# Patient Record
Sex: MALE | Race: WHITE | NOT HISPANIC OR LATINO | URBAN - METROPOLITAN AREA
[De-identification: names, ages, dates, MRNs, and addresses within clinical notes are randomized per-mention and may not be internally consistent; named-entity substitution may affect disease eponyms.]

---

## 2024-04-22 ENCOUNTER — ANESTHESIA (OUTPATIENT)
Dept: CARDIOLOGY | Facility: HOSPITAL | Age: 69
End: 2024-04-22
Payer: MEDICARE

## 2024-04-22 ENCOUNTER — ANESTHESIA EVENT (OUTPATIENT)
Dept: CARDIOLOGY | Facility: HOSPITAL | Age: 69
End: 2024-04-22
Payer: MEDICARE

## 2024-04-22 ENCOUNTER — HOSPITAL ENCOUNTER (OUTPATIENT)
Facility: HOSPITAL | Age: 69
Discharge: HOME | End: 2024-04-23
Attending: EMERGENCY MEDICINE | Admitting: STUDENT IN AN ORGANIZED HEALTH CARE EDUCATION/TRAINING PROGRAM
Payer: MEDICARE

## 2024-04-22 DIAGNOSIS — I48.92 ATRIAL FLUTTER WITH RAPID VENTRICULAR RESPONSE: Primary | ICD-10-CM

## 2024-04-22 DIAGNOSIS — R07.9 CHEST PAIN: ICD-10-CM

## 2024-04-22 DIAGNOSIS — R06.02 SHORTNESS OF BREATH: ICD-10-CM

## 2024-04-22 PROBLEM — E83.51 HYPOCALCEMIA: Status: ACTIVE | Noted: 2024-04-22

## 2024-04-22 PROBLEM — E11.9 TYPE 2 DIABETES MELLITUS WITHOUT COMPLICATION, WITHOUT LONG-TERM CURRENT USE OF INSULIN: Status: ACTIVE | Noted: 2024-04-22

## 2024-04-22 PROBLEM — E83.42 HYPOMAGNESEMIA: Status: ACTIVE | Noted: 2024-04-22

## 2024-04-22 PROBLEM — I10 PRIMARY HYPERTENSION: Status: ACTIVE | Noted: 2024-04-22

## 2024-04-22 PROBLEM — I50.9 ACUTE CONGESTIVE HEART FAILURE: Status: ACTIVE | Noted: 2024-04-22

## 2024-04-22 LAB
ALBUMIN SERPL BCP-MCNC: 1.2 G/DL (ref 3.5–5.2)
ALBUMIN SERPL BCP-MCNC: 2.8 G/DL (ref 3.5–5.2)
ALBUMIN SERPL BCP-MCNC: 3.8 G/DL (ref 3.5–5.2)
ALP SERPL-CCNC: 23 U/L (ref 55–135)
ALP SERPL-CCNC: 54 U/L (ref 55–135)
ALP SERPL-CCNC: 73 U/L (ref 55–135)
ALT SERPL W/O P-5'-P-CCNC: 14 U/L (ref 10–44)
ALT SERPL W/O P-5'-P-CCNC: 19 U/L (ref 10–44)
ALT SERPL W/O P-5'-P-CCNC: <5 U/L (ref 10–44)
ANION GAP SERPL CALC-SCNC: 11 MMOL/L (ref 8–16)
ANION GAP SERPL CALC-SCNC: 14 MMOL/L (ref 8–16)
ANION GAP SERPL CALC-SCNC: 5 MMOL/L (ref 8–16)
AORTIC ROOT ANNULUS: 3.53 CM
AORTIC VALVE CUSP SEPERATION: 1.81 CM
ASCENDING AORTA: 3.31 CM
AST SERPL-CCNC: 14 U/L (ref 10–40)
AST SERPL-CCNC: 19 U/L (ref 10–40)
AST SERPL-CCNC: 5 U/L (ref 10–40)
AV INDEX (PROSTH): 0.77
AV MEAN GRADIENT: 2 MMHG
AV PEAK GRADIENT: 3 MMHG
AV VALVE AREA BY VELOCITY RATIO: 4.25 CM²
AV VALVE AREA: 3.54 CM²
AV VELOCITY RATIO: 0.92
BASOPHILS # BLD AUTO: 0.03 K/UL (ref 0–0.2)
BASOPHILS NFR BLD: 0.5 % (ref 0–1.9)
BILIRUB SERPL-MCNC: 0.8 MG/DL (ref 0.1–1)
BILIRUB SERPL-MCNC: 1.7 MG/DL (ref 0.1–1)
BILIRUB SERPL-MCNC: 2.4 MG/DL (ref 0.1–1)
BNP SERPL-MCNC: 110 PG/ML (ref 0–99)
BSA FOR ECHO PROCEDURE: 2.2 M2
BSA FOR ECHO PROCEDURE: 2.2 M2
BUN SERPL-MCNC: 14 MG/DL (ref 8–23)
BUN SERPL-MCNC: 14 MG/DL (ref 8–23)
BUN SERPL-MCNC: 8 MG/DL (ref 8–23)
CALCIUM SERPL-MCNC: 10.1 MG/DL (ref 8.7–10.5)
CALCIUM SERPL-MCNC: 6.6 MG/DL (ref 8.7–10.5)
CALCIUM SERPL-MCNC: 7.5 MG/DL (ref 8.7–10.5)
CHLORIDE SERPL-SCNC: 102 MMOL/L (ref 95–110)
CHLORIDE SERPL-SCNC: 107 MMOL/L (ref 95–110)
CHLORIDE SERPL-SCNC: 112 MMOL/L (ref 95–110)
CO2 SERPL-SCNC: 12 MMOL/L (ref 23–29)
CO2 SERPL-SCNC: 22 MMOL/L (ref 23–29)
CO2 SERPL-SCNC: 27 MMOL/L (ref 23–29)
CREAT SERPL-MCNC: 0.3 MG/DL (ref 0.5–1.4)
CREAT SERPL-MCNC: 0.7 MG/DL (ref 0.5–1.4)
CREAT SERPL-MCNC: 1 MG/DL (ref 0.5–1.4)
CV ECHO LV RWT: 0.6 CM
DIFFERENTIAL METHOD BLD: NORMAL
DOP CALC AO PEAK VEL: 0.93 M/S
DOP CALC AO VTI: 17.9 CM
DOP CALC LVOT AREA: 4.6 CM2
DOP CALC LVOT DIAMETER: 2.42 CM
DOP CALC LVOT PEAK VEL: 0.86 M/S
DOP CALC LVOT STROKE VOLUME: 63.44 CM3
DOP CALCLVOT PEAK VEL VTI: 13.8 CM
ECHO LV POSTERIOR WALL: 1.44 CM (ref 0.6–1.1)
EOSINOPHIL # BLD AUTO: 0.1 K/UL (ref 0–0.5)
EOSINOPHIL NFR BLD: 1.3 % (ref 0–8)
ERYTHROCYTE [DISTWIDTH] IN BLOOD BY AUTOMATED COUNT: 13.1 % (ref 11.5–14.5)
EST. GFR  (NO RACE VARIABLE): >60 ML/MIN/1.73 M^2
FRACTIONAL SHORTENING: 11 % (ref 28–44)
GLUCOSE SERPL-MCNC: 127 MG/DL (ref 70–110)
GLUCOSE SERPL-MCNC: 222 MG/DL (ref 70–110)
GLUCOSE SERPL-MCNC: 278 MG/DL (ref 70–110)
HCT VFR BLD AUTO: 44.7 % (ref 40–54)
HGB BLD-MCNC: 15 G/DL (ref 14–18)
IMM GRANULOCYTES # BLD AUTO: 0.02 K/UL (ref 0–0.04)
IMM GRANULOCYTES NFR BLD AUTO: 0.3 % (ref 0–0.5)
INTERVENTRICULAR SEPTUM: 1.33 CM (ref 0.6–1.1)
IVC DIAMETER: 2.8 CM
IVRT: 112.27 MSEC
LA MAJOR: 6.14 CM
LA MINOR: 5.48 CM
LA WIDTH: 4.5 CM
LEFT ATRIUM SIZE: 4.46 CM
LEFT ATRIUM VOLUME INDEX: 45.3 ML/M2
LEFT ATRIUM VOLUME: 98.8 CM3
LEFT INTERNAL DIMENSION IN SYSTOLE: 4.3 CM (ref 2.1–4)
LEFT VENTRICLE DIASTOLIC VOLUME INDEX: 50.01 ML/M2
LEFT VENTRICLE DIASTOLIC VOLUME: 109.03 ML
LEFT VENTRICLE MASS INDEX: 125 G/M2
LEFT VENTRICLE SYSTOLIC VOLUME INDEX: 38.1 ML/M2
LEFT VENTRICLE SYSTOLIC VOLUME: 82.95 ML
LEFT VENTRICULAR INTERNAL DIMENSION IN DIASTOLE: 4.83 CM (ref 3.5–6)
LEFT VENTRICULAR MASS: 272.08 G
LVOT MG: 1.68 MMHG
LVOT MV: 0.62 CM/S
LYMPHOCYTES # BLD AUTO: 1.8 K/UL (ref 1–4.8)
LYMPHOCYTES NFR BLD: 28.5 % (ref 18–48)
MAGNESIUM SERPL-MCNC: 1.3 MG/DL (ref 1.6–2.6)
MAGNESIUM SERPL-MCNC: 2.1 MG/DL (ref 1.6–2.6)
MAGNESIUM SERPL-MCNC: <0.7 MG/DL (ref 1.6–2.6)
MCH RBC QN AUTO: 29.1 PG (ref 27–31)
MCHC RBC AUTO-ENTMCNC: 33.6 G/DL (ref 32–36)
MCV RBC AUTO: 87 FL (ref 82–98)
MONOCYTES # BLD AUTO: 0.4 K/UL (ref 0.3–1)
MONOCYTES NFR BLD: 6.8 % (ref 4–15)
NEUTROPHILS # BLD AUTO: 3.9 K/UL (ref 1.8–7.7)
NEUTROPHILS NFR BLD: 62.6 % (ref 38–73)
NRBC BLD-RTO: 0 /100 WBC
OHS CV RV/LV RATIO: 0.94 CM
PISA TR MAX VEL: 3.22 M/S
PLATELET # BLD AUTO: 178 K/UL (ref 150–450)
PMV BLD AUTO: 12 FL (ref 9.2–12.9)
POCT GLUCOSE: 246 MG/DL (ref 70–110)
POTASSIUM SERPL-SCNC: 3.3 MMOL/L (ref 3.5–5.1)
POTASSIUM SERPL-SCNC: 4 MMOL/L (ref 3.5–5.1)
POTASSIUM SERPL-SCNC: 4.2 MMOL/L (ref 3.5–5.1)
PROT SERPL-MCNC: 2.2 G/DL (ref 6–8.4)
PROT SERPL-MCNC: 5.1 G/DL (ref 6–8.4)
PROT SERPL-MCNC: 7.1 G/DL (ref 6–8.4)
PULM VEIN S/D RATIO: 0.72
PV PEAK D VEL: 0.36 M/S
PV PEAK GRADIENT: 3 MMHG
PV PEAK S VEL: 0.26 M/S
PV PEAK VELOCITY: 0.86 M/S
RA MAJOR: 6.06 CM
RA PRESSURE ESTIMATED: 15 MMHG
RA WIDTH: 4.8 CM
RBC # BLD AUTO: 5.16 M/UL (ref 4.6–6.2)
RIGHT VENTRICULAR END-DIASTOLIC DIMENSION: 4.54 CM
RV TB RVSP: 18 MMHG
RV TISSUE DOPPLER FREE WALL SYSTOLIC VELOCITY 1 (APICAL 4 CHAMBER VIEW): 10.67 CM/S
SINUS: 3.6 CM
SINUS: 3.67 CM
SODIUM SERPL-SCNC: 133 MMOL/L (ref 136–145)
SODIUM SERPL-SCNC: 139 MMOL/L (ref 136–145)
SODIUM SERPL-SCNC: 140 MMOL/L (ref 136–145)
STJ: 3.26 CM
TR MAX PG: 41 MMHG
TRICUSPID ANNULAR PLANE SYSTOLIC EXCURSION: 1.77 CM
TROPONIN I SERPL DL<=0.01 NG/ML-MCNC: 0.04 NG/ML (ref 0–0.03)
TSH SERPL DL<=0.005 MIU/L-ACNC: 1.66 UIU/ML (ref 0.4–4)
TV REST PULMONARY ARTERY PRESSURE: 56 MMHG
WBC # BLD AUTO: 6.22 K/UL (ref 3.9–12.7)
Z-SCORE OF LEFT VENTRICULAR DIMENSION IN END DIASTOLE: -4.11
Z-SCORE OF LEFT VENTRICULAR DIMENSION IN END SYSTOLE: -0.24

## 2024-04-22 PROCEDURE — 63600175 PHARM REV CODE 636 W HCPCS: Performed by: EMERGENCY MEDICINE

## 2024-04-22 PROCEDURE — 85025 COMPLETE CBC W/AUTO DIFF WBC: CPT | Performed by: EMERGENCY MEDICINE

## 2024-04-22 PROCEDURE — 25000003 PHARM REV CODE 250: Performed by: STUDENT IN AN ORGANIZED HEALTH CARE EDUCATION/TRAINING PROGRAM

## 2024-04-22 PROCEDURE — 37000009 HC ANESTHESIA EA ADD 15 MINS: Performed by: INTERNAL MEDICINE

## 2024-04-22 PROCEDURE — 96375 TX/PRO/DX INJ NEW DRUG ADDON: CPT

## 2024-04-22 PROCEDURE — 25000242 PHARM REV CODE 250 ALT 637 W/ HCPCS: Performed by: INTERNAL MEDICINE

## 2024-04-22 PROCEDURE — D9220A PRA ANESTHESIA: Mod: CRNA,,, | Performed by: NURSE ANESTHETIST, CERTIFIED REGISTERED

## 2024-04-22 PROCEDURE — 63600175 PHARM REV CODE 636 W HCPCS: Performed by: NURSE ANESTHETIST, CERTIFIED REGISTERED

## 2024-04-22 PROCEDURE — 93010 ELECTROCARDIOGRAM REPORT: CPT | Mod: XU,,, | Performed by: INTERNAL MEDICINE

## 2024-04-22 PROCEDURE — 82330 ASSAY OF CALCIUM: CPT | Performed by: STUDENT IN AN ORGANIZED HEALTH CARE EDUCATION/TRAINING PROGRAM

## 2024-04-22 PROCEDURE — 84443 ASSAY THYROID STIM HORMONE: CPT | Performed by: EMERGENCY MEDICINE

## 2024-04-22 PROCEDURE — 80053 COMPREHEN METABOLIC PANEL: CPT | Performed by: EMERGENCY MEDICINE

## 2024-04-22 PROCEDURE — 25000003 PHARM REV CODE 250: Performed by: INTERNAL MEDICINE

## 2024-04-22 PROCEDURE — 27000221 HC OXYGEN, UP TO 24 HOURS

## 2024-04-22 PROCEDURE — 63600175 PHARM REV CODE 636 W HCPCS: Performed by: INTERNAL MEDICINE

## 2024-04-22 PROCEDURE — 25000003 PHARM REV CODE 250: Performed by: EMERGENCY MEDICINE

## 2024-04-22 PROCEDURE — G0378 HOSPITAL OBSERVATION PER HR: HCPCS

## 2024-04-22 PROCEDURE — 99285 EMERGENCY DEPT VISIT HI MDM: CPT | Mod: 25

## 2024-04-22 PROCEDURE — 99291 CRITICAL CARE FIRST HOUR: CPT | Mod: 25,,, | Performed by: INTERNAL MEDICINE

## 2024-04-22 PROCEDURE — 83735 ASSAY OF MAGNESIUM: CPT | Mod: 91 | Performed by: STUDENT IN AN ORGANIZED HEALTH CARE EDUCATION/TRAINING PROGRAM

## 2024-04-22 PROCEDURE — 83880 ASSAY OF NATRIURETIC PEPTIDE: CPT | Performed by: EMERGENCY MEDICINE

## 2024-04-22 PROCEDURE — 37000008 HC ANESTHESIA 1ST 15 MINUTES: Performed by: INTERNAL MEDICINE

## 2024-04-22 PROCEDURE — 83036 HEMOGLOBIN GLYCOSYLATED A1C: CPT | Performed by: STUDENT IN AN ORGANIZED HEALTH CARE EDUCATION/TRAINING PROGRAM

## 2024-04-22 PROCEDURE — 93005 ELECTROCARDIOGRAM TRACING: CPT

## 2024-04-22 PROCEDURE — 96372 THER/PROPH/DIAG INJ SC/IM: CPT | Performed by: INTERNAL MEDICINE

## 2024-04-22 PROCEDURE — 25000003 PHARM REV CODE 250: Performed by: NURSE ANESTHETIST, CERTIFIED REGISTERED

## 2024-04-22 PROCEDURE — 96361 HYDRATE IV INFUSION ADD-ON: CPT

## 2024-04-22 PROCEDURE — 63600175 PHARM REV CODE 636 W HCPCS: Performed by: STUDENT IN AN ORGANIZED HEALTH CARE EDUCATION/TRAINING PROGRAM

## 2024-04-22 PROCEDURE — 94761 N-INVAS EAR/PLS OXIMETRY MLT: CPT

## 2024-04-22 PROCEDURE — 96360 HYDRATION IV INFUSION INIT: CPT | Mod: 59

## 2024-04-22 PROCEDURE — 80053 COMPREHEN METABOLIC PANEL: CPT | Mod: 91 | Performed by: STUDENT IN AN ORGANIZED HEALTH CARE EDUCATION/TRAINING PROGRAM

## 2024-04-22 PROCEDURE — 93010 ELECTROCARDIOGRAM REPORT: CPT | Mod: 76,ICN,, | Performed by: INTERNAL MEDICINE

## 2024-04-22 PROCEDURE — D9220A PRA ANESTHESIA: Mod: ANES,,, | Performed by: ANESTHESIOLOGY

## 2024-04-22 PROCEDURE — 96374 THER/PROPH/DIAG INJ IV PUSH: CPT

## 2024-04-22 PROCEDURE — 36415 COLL VENOUS BLD VENIPUNCTURE: CPT | Performed by: STUDENT IN AN ORGANIZED HEALTH CARE EDUCATION/TRAINING PROGRAM

## 2024-04-22 PROCEDURE — 25000003 PHARM REV CODE 250: Mod: JZ,JG | Performed by: EMERGENCY MEDICINE

## 2024-04-22 PROCEDURE — 83735 ASSAY OF MAGNESIUM: CPT | Mod: 91 | Performed by: EMERGENCY MEDICINE

## 2024-04-22 PROCEDURE — 84484 ASSAY OF TROPONIN QUANT: CPT | Performed by: EMERGENCY MEDICINE

## 2024-04-22 RX ORDER — SODIUM CHLORIDE 0.9 % (FLUSH) 0.9 %
10 SYRINGE (ML) INJECTION EVERY 12 HOURS PRN
Status: DISCONTINUED | OUTPATIENT
Start: 2024-04-22 | End: 2024-04-23 | Stop reason: HOSPADM

## 2024-04-22 RX ORDER — LIDOCAINE HYDROCHLORIDE 20 MG/ML
INJECTION INTRAVENOUS
Status: DISCONTINUED | OUTPATIENT
Start: 2024-04-22 | End: 2024-04-22

## 2024-04-22 RX ORDER — ENOXAPARIN SODIUM 100 MG/ML
1 INJECTION SUBCUTANEOUS
Status: COMPLETED | OUTPATIENT
Start: 2024-04-22 | End: 2024-04-22

## 2024-04-22 RX ORDER — IBUPROFEN 200 MG
16 TABLET ORAL
Status: DISCONTINUED | OUTPATIENT
Start: 2024-04-22 | End: 2024-04-23 | Stop reason: HOSPADM

## 2024-04-22 RX ORDER — CALCIUM GLUCONATE 20 MG/ML
1 INJECTION, SOLUTION INTRAVENOUS
Status: COMPLETED | OUTPATIENT
Start: 2024-04-22 | End: 2024-04-22

## 2024-04-22 RX ORDER — SODIUM CHLORIDE 0.9 % (FLUSH) 0.9 %
10 SYRINGE (ML) INJECTION
Status: DISCONTINUED | OUTPATIENT
Start: 2024-04-22 | End: 2024-04-23 | Stop reason: HOSPADM

## 2024-04-22 RX ORDER — LIDOCAINE HYDROCHLORIDE 40 MG/ML
SOLUTION TOPICAL
Status: DISCONTINUED | OUTPATIENT
Start: 2024-04-22 | End: 2024-04-22

## 2024-04-22 RX ORDER — MAGNESIUM SULFATE 1 G/100ML
1 INJECTION INTRAVENOUS
Status: COMPLETED | OUTPATIENT
Start: 2024-04-22 | End: 2024-04-22

## 2024-04-22 RX ORDER — SPIRONOLACTONE 25 MG/1
25 TABLET ORAL DAILY
Status: DISCONTINUED | OUTPATIENT
Start: 2024-04-22 | End: 2024-04-23 | Stop reason: HOSPADM

## 2024-04-22 RX ORDER — ETOMIDATE 2 MG/ML
INJECTION INTRAVENOUS
Status: DISCONTINUED | OUTPATIENT
Start: 2024-04-22 | End: 2024-04-22

## 2024-04-22 RX ORDER — INSULIN ASPART 100 [IU]/ML
0-5 INJECTION, SOLUTION INTRAVENOUS; SUBCUTANEOUS
Status: DISCONTINUED | OUTPATIENT
Start: 2024-04-22 | End: 2024-04-23 | Stop reason: HOSPADM

## 2024-04-22 RX ORDER — DILTIAZEM HYDROCHLORIDE 5 MG/ML
15 INJECTION INTRAVENOUS
Status: COMPLETED | OUTPATIENT
Start: 2024-04-22 | End: 2024-04-22

## 2024-04-22 RX ORDER — ONDANSETRON HYDROCHLORIDE 2 MG/ML
INJECTION, SOLUTION INTRAVENOUS
Status: DISCONTINUED | OUTPATIENT
Start: 2024-04-22 | End: 2024-04-22

## 2024-04-22 RX ORDER — CARVEDILOL 3.12 MG/1
3.12 TABLET ORAL 2 TIMES DAILY
Status: DISCONTINUED | OUTPATIENT
Start: 2024-04-22 | End: 2024-04-23

## 2024-04-22 RX ORDER — NALOXONE HCL 0.4 MG/ML
0.02 VIAL (ML) INJECTION
Status: DISCONTINUED | OUTPATIENT
Start: 2024-04-22 | End: 2024-04-23 | Stop reason: HOSPADM

## 2024-04-22 RX ORDER — CALCIUM GLUCONATE 20 MG/ML
1 INJECTION, SOLUTION INTRAVENOUS ONCE
Qty: 50 ML | Refills: 0 | Status: COMPLETED | OUTPATIENT
Start: 2024-04-22 | End: 2024-04-22

## 2024-04-22 RX ORDER — MAGNESIUM SULFATE 1 G/100ML
1 INJECTION INTRAVENOUS ONCE
Status: COMPLETED | OUTPATIENT
Start: 2024-04-22 | End: 2024-04-22

## 2024-04-22 RX ORDER — GLUCAGON 1 MG
1 KIT INJECTION
Status: DISCONTINUED | OUTPATIENT
Start: 2024-04-22 | End: 2024-04-23 | Stop reason: HOSPADM

## 2024-04-22 RX ORDER — IBUPROFEN 200 MG
24 TABLET ORAL
Status: DISCONTINUED | OUTPATIENT
Start: 2024-04-22 | End: 2024-04-23 | Stop reason: HOSPADM

## 2024-04-22 RX ORDER — FUROSEMIDE 20 MG/1
20 TABLET ORAL 2 TIMES DAILY
Status: DISCONTINUED | OUTPATIENT
Start: 2024-04-22 | End: 2024-04-23 | Stop reason: HOSPADM

## 2024-04-22 RX ADMIN — DILTIAZEM HYDROCHLORIDE 15 MG: 5 INJECTION INTRAVENOUS at 11:04

## 2024-04-22 RX ADMIN — SPIRONOLACTONE 25 MG: 25 TABLET ORAL at 03:04

## 2024-04-22 RX ADMIN — POTASSIUM BICARBONATE 50 MEQ: 977.5 TABLET, EFFERVESCENT ORAL at 03:04

## 2024-04-22 RX ADMIN — FUROSEMIDE 20 MG: 20 TABLET ORAL at 05:04

## 2024-04-22 RX ADMIN — MAGNESIUM SULFATE 1 G: 1 INJECTION INTRAVENOUS at 02:04

## 2024-04-22 RX ADMIN — SACUBITRIL AND VALSARTAN 1 TABLET: 24; 26 TABLET, FILM COATED ORAL at 09:04

## 2024-04-22 RX ADMIN — ENOXAPARIN SODIUM 100 MG: 100 INJECTION SUBCUTANEOUS at 12:04

## 2024-04-22 RX ADMIN — RIVAROXABAN 20 MG: 20 TABLET, FILM COATED ORAL at 05:04

## 2024-04-22 RX ADMIN — CARVEDILOL 3.12 MG: 3.12 TABLET, FILM COATED ORAL at 09:04

## 2024-04-22 RX ADMIN — SODIUM CHLORIDE, SODIUM LACTATE, POTASSIUM CHLORIDE, AND CALCIUM CHLORIDE: .6; .31; .03; .02 INJECTION, SOLUTION INTRAVENOUS at 01:04

## 2024-04-22 RX ADMIN — ETOMIDATE 2 MG: 2 INJECTION, SOLUTION INTRAVENOUS at 01:04

## 2024-04-22 RX ADMIN — CALCIUM GLUCONATE 1 G: 20 INJECTION, SOLUTION INTRAVENOUS at 05:04

## 2024-04-22 RX ADMIN — ETOMIDATE 7 MG: 2 INJECTION, SOLUTION INTRAVENOUS at 01:04

## 2024-04-22 RX ADMIN — LIDOCAINE HYDROCHLORIDE 4 ML: 40 SOLUTION TOPICAL at 01:04

## 2024-04-22 RX ADMIN — LIDOCAINE HYDROCHLORIDE 40 MG: 20 INJECTION, SOLUTION INTRAVENOUS at 01:04

## 2024-04-22 RX ADMIN — EMPAGLIFLOZIN 10 MG: 10 TABLET, FILM COATED ORAL at 03:04

## 2024-04-22 RX ADMIN — CALCIUM GLUCONATE 1 G: 20 INJECTION, SOLUTION INTRAVENOUS at 02:04

## 2024-04-22 RX ADMIN — MAGNESIUM SULFATE 1 G: 1 INJECTION INTRAVENOUS at 03:04

## 2024-04-22 RX ADMIN — SODIUM CHLORIDE, POTASSIUM CHLORIDE, SODIUM LACTATE AND CALCIUM CHLORIDE 1000 ML: 600; 310; 30; 20 INJECTION, SOLUTION INTRAVENOUS at 11:04

## 2024-04-22 RX ADMIN — ONDANSETRON 4 MG: 2 INJECTION, SOLUTION INTRAMUSCULAR; INTRAVENOUS at 01:04

## 2024-04-22 NOTE — LETTER
April 23, 2024         Humberto GREEN  [facility]  [place] [address]  Phone: [phone]  Fax: [phone]       Patient: Varghese Amaya   YOB: 1955  Date of Visit: 4/22/2024    To Whom It May Concern:    Jorge Amaya  was at [facility] on 4/22/2024-4/23/2024. The patient may return to work/school on 4/25/2024 with no restrictions. If you have any questions or concerns, or if I can be of further assistance, please do not hesitate to contact me.    Sincerely,    ILYA Polk RN

## 2024-04-22 NOTE — CONSULTS
[facility] Emergency Dept  Cardiology  Consult Note    Patient Name: Varghese Amaya  MRN: [id number]  Admission Date: 4/22/2024  Hospital Length of Stay: 0 days  Code Status: No Order   Attending Provider: Ramon Garibay MD   Consulting Provider: Pradip Eng MD  Primary Care Physician: No primary care provider on file.  Principal Problem:Atrial flutter with rapid ventricular response    Patient information was obtained from patient and ER records.     Inpatient consult to Cardiology  Consult performed by: Pradip Eng MD  Consult ordered by: Ramon Garibay MD  Reason for consult: AFL/RVR        Subjective:     Chief Complaint:  SOB     HPI:   69 y.o male presents to the ED via EMS transportation c/o intermittent shortness of breath for the last 1.5 weeks. Pt reports that he used his wife's mobile afib monitor and was found to be in atrial fibrillation prompting him to visit [facility] ED. He received 5 mg of Cardizem for treatment and was sent to this ED for further evaluation. Per EMS, pt had an oxygen saturation of 92% on room air. No use of blood thinners. No recent illnesses. He denies chest pain, leg swelling, headache or urine issues. No other associated symptoms.     Cardiology consulted for AF/RVR.    The patient is a very pleasant 69-year-old man who lives in [facility] but works locally.  He presents to the emergency room with progressive shortness of breath and was found to be in atrial flutter with RVR.  Heart rate was controlled with diltiazem.  He is intermittent RVR now.  He denies any overt ongoing shortness of breath.  He has had no angina.  He denies any syncope or prior stroke/TIA.  He does have a history of hypertension and diabetes, giving him a CHADS-VASc score of 3.  Pros and cons of PETTY guided cardioversion were discussed with the patient and he agrees to proceed.  Permit was signed.    No past medical history on file.    No past surgical history on  file.    Review of patient's allergies indicates:  No Known Allergies    No current outpatient medications      Current Facility-Administered Medications   Medication Dose Route Frequency Provider Last Rate Last Admin    enoxaparin injection 100 mg  1 mg/kg Subcutaneous Q12H Pradip Eng MD        lactated ringers bolus 1,000 mL  1,000 mL Intravenous ED 1 Time Ramon Garibay  mL/hr at 04/22/24 1100 1,000 mL at 04/22/24 1100    rivaroxaban tablet 20 mg  20 mg Oral Daily with dinner Pradip Eng MD         No current outpatient medications on file.     Family History    None       Tobacco Use    Smoking status: Not on file    Smokeless tobacco: Not on file   Substance and Sexual Activity    Alcohol use: Not on file    Drug use: Not on file    Sexual activity: Not on file     Review of Systems   Constitutional: Negative for chills, diaphoresis, fever and malaise/fatigue.   HENT:  Negative for nosebleeds.    Eyes:  Negative for blurred vision and double vision.   Cardiovascular:  Positive for dyspnea on exertion. Negative for chest pain, claudication, cyanosis, leg swelling, orthopnea, palpitations, paroxysmal nocturnal dyspnea and syncope.   Respiratory:  Positive for shortness of breath. Negative for cough and wheezing.    Skin:  Negative for dry skin and poor wound healing.   Musculoskeletal:  Negative for back pain, joint swelling and myalgias.   Gastrointestinal:  Negative for abdominal pain, nausea and vomiting.   Genitourinary:  Negative for hematuria.   Neurological:  Negative for dizziness, headaches, numbness, seizures and weakness.   Psychiatric/Behavioral:  Negative for altered mental status and depression.      Objective:     Vital Signs (Most Recent):  Temp: 98.1 °F (36.7 °C) (04/22/24 1049)  Pulse: 89 (04/22/24 1139)  Resp: 17 (04/22/24 1132)  BP: 118/84 (04/22/24 1132)  SpO2: 96 % (04/22/24 1139) Vital Signs (24h Range):  Temp:  [98.1 °F (36.7 °C)] 98.1 °F (36.7 °C)  Pulse:   [] 89  Resp:  [17-20] 17  SpO2:  [96 %-97 %] 96 %  BP: (118-134)/(76-90) 118/84     Weight: 95.3 kg (210 lb)  Body mass index is 28.48 kg/m².    SpO2: 96 %       No intake or output data in the 24 hours ending 04/22/24 1144    Lines/Drains/Airways       Peripheral Intravenous Line  Duration                  Peripheral IV - Single Lumen 04/22/24 1055 20 G Posterior;Right Hand <1 day         Peripheral IV - Single Lumen 04/22/24 20 G Right Antecubital <1 day                     Physical Exam  Constitutional:       General: He is not in acute distress.     Appearance: He is well-developed. He is not ill-appearing, toxic-appearing or diaphoretic.   HENT:      Head: Normocephalic and atraumatic.   Eyes:      General: No scleral icterus.     Extraocular Movements: Extraocular movements intact.      Conjunctiva/sclera: Conjunctivae normal.      Pupils: Pupils are equal, round, and reactive to light.   Neck:      Thyroid: No thyromegaly.      Vascular: No JVD.      Trachea: No tracheal deviation.   Cardiovascular:      Rate and Rhythm: Normal rate. Rhythm irregularly irregular.      Heart sounds: S1 normal and S2 normal. Heart sounds are distant. No murmur heard.     No friction rub. No gallop.   Pulmonary:      Effort: Pulmonary effort is normal. No respiratory distress.      Breath sounds: Normal breath sounds. No stridor. No wheezing, rhonchi or rales.   Chest:      Chest wall: No tenderness.   Abdominal:      General: There is no distension.      Palpations: Abdomen is soft.   Musculoskeletal:         General: No swelling or tenderness. Normal range of motion.      Cervical back: Normal range of motion and neck supple. No rigidity.      Right lower leg: Edema present.      Left lower leg: Edema present.   Skin:     General: Skin is warm and dry.      Coloration: Skin is not jaundiced.   Neurological:      General: No focal deficit present.      Mental Status: He is alert and oriented to person, place, and time.     "  Cranial Nerves: No cranial nerve deficit.   Psychiatric:         Mood and Affect: Mood normal.         Behavior: Behavior normal.          Current Medications:  Current Facility-Administered Medications   Medication Dose Route Frequency    enoxaparin  1 mg/kg Subcutaneous Q12H    lactated ringers  1,000 mL Intravenous ED 1 Time    rivaroxaban  20 mg Oral Daily with dinner     Current Facility-Administered Medications   Medication Dose Route Frequency Last Rate Last Admin         Laboratory (all labs reviewed):  CBC:  Recent Labs   Lab 04/22/24  1100   WBC 6.22   Hemoglobin 15.0   Hematocrit 44.7   Platelets 178       CHEMISTRIES:        Invalid input(s): "ESTGFRNONAFRICA"    CARDIAC BIOMARKERS:        COAGS:        LIPIDS/LFTS:        BNP:        TSH:        Free T4:        Diagnostic Results:  ECG (personally reviewed and interpreted tracing(s)):  4/22/24 1052     Chest X-Ray (personally reviewed and interpreted image(s)): 4/22/24 CHF    Echo: ordered    Assessment and Plan:     * Atrial flutter with rapid ventricular response  The patient presents with several weeks of progressive shortness of breath and found to be in atrial flutter with RVR.    CHADS-VASc 3.    Heart rate reasonably controlled with intravenous diltiazem.    We will plan PETTY guided cardioversion today.    Enoxaparin 1 milligram/kilogram x1 dose, followed by Xarelto 20 mg q.h.s..      Risks, benefits and alternatives of the PETTY/Cardioversion procedure were discussed with the patient and his wife in attendance including throat irritation, aspiration, anesthetic complications, esophageal irritation/perforation, skin irritation, arrhythmia, etc.  Patient understands and agrees to proceed.  Permits signed.       Acute congestive heart failure  Appears to be overloaded on examination with leg swelling and cephalization on chest x-ray.    Laboratories pending.    Consider IV Lasix.    LV function to be evaluated with PETTY today.    Primary " hypertension  Cont med rx    Type 2 diabetes mellitus without complication, without long-term current use of insulin  Mgmt per IM        VTE Risk Mitigation (From admission, onward)           Ordered     rivaroxaban tablet 20 mg  With dinner         04/22/24 1143     enoxaparin injection 100 mg  Every 12 hours (non-standard times)         04/22/24 1143                  Pt seen in trauma bay, case d/w Dr. Garibay.  Intermittent RVR noted, pt tolerating well.  Critical care time 35min.    Thank you for your consult. I will follow-up with patient. Please contact us if you have any additional questions.    Pradip Eng MD  Cardiology   [facility] Dept

## 2024-04-22 NOTE — H&P
[facility] Medicine  History & Physical    Patient Name: Varghese Amaya  MRN: [id number]  Patient Class: OP- Observation  Admission Date: 4/22/2024  Attending Physician: Vikki Obrien DO   Primary Care Provider: Unable, To Obtain         Patient information was obtained from patient and ER records.     Subjective:     Principal Problem:Atrial flutter with rapid ventricular response    Chief Complaint:   Chief Complaint   Patient presents with    Shortness of Breath     Pt reports to ED via EMS from [facility] for Afib. Pt reports from Acute A-fib, 5mg of Cadiiazem. Pt complaints of SOB that happen intermittently. Per EMS 92% on RA.         HPI: 69-year-old male with past medical history of hypertension and diabetes presented to the ED with complaints of intermittent shortness of breath.  Seen in post procedure area. Patient stated that it started approximately 1.5 weeks ago and just felt like there are times where he cannot catch his breath.  States that it would occur both at rest and with ambulation. He used his wife's mobile EKG device which noted atrial fibillation so he went to [facility].  HR was >140s per ED. There he received IV Cardizem 5 mg without improvement.  Thus, was transfer to [facility] for further evaluation.  Patient denies any headaches, chest pain, vision changes, syncope, or any history of stroke or MIs. Denies lower extremities swelling.     In the ED, patient tachycardic with heart rate in the 120s.  Patient was placed on nasal cannula.  Received 15mg of dilt with ED with better rate control. EKG with atrial flutter.  Chest x-ray with Ill-defined airspace opacity in the right lung base, prominent interstitial markings bilaterally. Labs significant for hypomagnesemia and hypocalcemia.  Electrolytes replaced in the ED. Cardiology consulted.  Patient given Lovenox 1 mg/kg.  Admitted to hospital medicine for further evaluation    No past medical history on  file.    No past surgical history on file.    Review of patient's allergies indicates:  No Known Allergies    Current Facility-Administered Medications   Medication Dose Route Frequency Provider Last Rate Last Admin    calcium gluconate 1 g in NS IVPB (premixed)  1 g Intravenous ED 1 Time Pradip Eng MD 50 mL/hr at 04/22/24 1436 1 g at 04/22/24 1436    carvediloL tablet 3.125 mg  3.125 mg Oral BID Pradip Eng MD        dextrose 10% bolus 125 mL 125 mL  12.5 g Intravenous PRN Obrien [facility] T., DO        dextrose 10% bolus 250 mL 250 mL  25 g Intravenous PRN Obrien, [facility] T., DO        empagliflozin (Jardiance) tablet 10 mg  10 mg Oral Daily Pradip Eng MD        furosemide tablet 20 mg  20 mg Oral BID Pradip Eng MD        glucagon (human recombinant) injection 1 mg  1 mg Intramuscular PRN Obrien, [facility] T., DO        glucose chewable tablet 16 g  16 g Oral PRN Obrien, [facility] T., DO        glucose chewable tablet 24 g  24 g Oral PRN Obrien, [facility] T., DO        insulin aspart U-100 pen 0-5 Units  0-5 Units Subcutaneous QID (AC + HS) PRN Obrien [facility] T., DO        magnesium sulfate in dextrose IVPB (premix) 1 g  1 g Intravenous ED 1 Time Pradip Eng  mL/hr at 04/22/24 1442 1 g at 04/22/24 1442    naloxone 0.4 mg/mL injection 0.02 mg  0.02 mg Intravenous PRN Obrien [facility] T., DO        rivaroxaban tablet 20 mg  20 mg Oral Daily with dinner Pradip Eng MD        sacubitriL-valsartan 24-26 mg per tablet 1 tablet  1 tablet Oral BID Pradip Eng MD        sodium chloride 0.9% flush 10 mL  10 mL Intravenous Q12H PRN Camille [facility] T., DO        spironolactone tablet 25 mg  25 mg Oral Daily Pradip Eng MD         Family History    None       Tobacco Use    Smoking status: Not on file    Smokeless tobacco: Not on file   Substance and Sexual Activity    Alcohol use: Not on file    Drug use: Not on file    Sexual activity: Not on file     Review of  Systems   Constitutional:  Negative for chills, fatigue and fever.   Respiratory:  Positive for shortness of breath. Negative for cough and chest tightness.    Cardiovascular:  Negative for chest pain, palpitations and leg swelling.   Gastrointestinal:  Negative for abdominal pain, nausea and vomiting.   Musculoskeletal:  Negative for back pain.   Neurological:  Negative for dizziness, weakness and headaches.     Objective:     Vital Signs (Most Recent):  Temp: 98.1 °F (36.7 °C) (04/22/24 1409)  Pulse: 92 (04/22/24 1445)  Resp: (!) 23 (04/22/24 1445)  BP: 122/85 (04/22/24 1445)  SpO2: 98 % (04/22/24 1445) Vital Signs (24h Range):  Temp:  [97.7 °F (36.5 °C)-98.1 °F (36.7 °C)] 98.1 °F (36.7 °C)  Pulse:  [] 92  Resp:  [16-24] 23  SpO2:  [96 %-100 %] 98 %  BP: (105-134)/(74-90) 122/85     Weight: 95.3 kg (210 lb)  Body mass index is 28.48 kg/m².     Physical Exam  Vitals and nursing note reviewed.   Constitutional:       General: He is not in acute distress.     Appearance: He is not ill-appearing.   HENT:      Mouth/Throat:      Mouth: Mucous membranes are moist.   Eyes:      Extraocular Movements: Extraocular movements intact.   Cardiovascular:      Rate and Rhythm: Normal rate and regular rhythm.      Heart sounds: No murmur heard.  Pulmonary:      Effort: Pulmonary effort is normal. No respiratory distress.      Breath sounds: Normal breath sounds. No wheezing.      Comments: On NC  Abdominal:      General: There is no distension.      Palpations: Abdomen is soft.      Tenderness: There is no abdominal tenderness.   Musculoskeletal:      Right lower leg: Edema present.      Left lower leg: Edema present.      Comments: Bilateral Lower extremity with +1 edema   Skin:     General: Skin is warm and dry.   Neurological:      General: No focal deficit present.      Mental Status: He is alert and oriented to person, place, and time.   Psychiatric:         Mood and Affect: Mood normal.         Thought Content:  Thought content normal.                Significant Labs: All pertinent labs within the past 24 hours have been reviewed.    CBC:   Recent Labs   Lab 04/22/24  1100   WBC 6.22   HGB 15.0   HCT 44.7        CMP:   Recent Labs   Lab 04/22/24  1206 04/22/24  1311   * 139   K 4.0 3.3*    112*   CO2 12* 22*   * 222*   BUN 8 14   CREATININE 0.3* 0.7   CALCIUM 6.6* 7.5*   PROT 2.2* 5.1*   ALBUMIN 1.2* 2.8*   BILITOT 0.8 1.7*   ALKPHOS 23* 54*   AST 5* 14   ALT <5* 14   ANIONGAP 14 5*     Cardiac Markers:   Recent Labs   Lab 04/22/24  1100   *       Magnesium:   Recent Labs   Lab 04/22/24  1206 04/22/24  1311   MG <0.7* 1.3*     Troponin:   Recent Labs   Lab 04/22/24  1100   TROPONINI 0.037*     TSH:   Recent Labs   Lab 04/22/24  1100   TSH 1.661         Significant Imaging: I have reviewed all pertinent imaging results/findings within the past 24 hours.    Imaging Results              X-Ray Chest AP Portable (Final result)  Result time 04/22/24 11:31:41      Final result by Tony Morrell MD (04/22/24 11:31:41)                   Impression:      As above      Electronically signed by: Tony Morrell MD  Date:    04/22/2024  Time:    11:31               Narrative:    EXAMINATION:  XR CHEST AP PORTABLE    CLINICAL HISTORY:  Chest Pain;    TECHNIQUE:  AP portable radiograph of the chest was performed.    COMPARISON:  No priors    FINDINGS:  Multiple overlying cardiac monitoring leads.  Cardiac silhouette upper normal in size.  Question mild pulmonary vascular engorgement.    Low lung volumes, symmetric bilaterally.  Ill-defined airspace opacity in the right lung base, with differential considerations to include atelectasis, aspiration or pneumonia. .  No focal consolidation evident elsewhere.  Prominent interstitial markings bilaterally.  Question small pleural effusions.  No pneumothorax.                                      Assessment/Plan:     * Atrial flutter with rapid ventricular  response  -presented with progressive shortness of breath for the past 1-2 weeks and found to be in atrial flutter with RVR.    -hx of HTN, DM, Age 69: CHADS-VASc 3.    -s/p 15mg of dilt in the ED with better rate control.  -Cardiology consulted: s/p PETTY/DCCV on 04/22/24. Echo with EF 15-20%, severe LV dysfunction and global hypokinesis  -Cardiology initiated Xarelto, Coreg, Entresto, Lasix, Jardiance, Aldactone.  -continue to monitor patient on telemetry      Acute congestive heart failure  -presented with intermittent shortness of breath. Has bilateral LE edema. Found to be in aflutter RVR  -  -CXR with prominent interstitial markings bilaterally.   -Echo with EF 15-20%, severe global hypokinesis.  -Started on CHF pathway  -Started PO lasix  -Cardiology initiated Xarelto, Coreg, Entresto, Lasix, Jardiance, Aldactone.  -Strict Is/Os        Patient is identified as having Systolic (HFrEF) heart failure that is Acute. CHF is currently uncontrolled due to Continued edema of extremities. Latest ECHO performed and demonstrates- Results for orders placed during the hospital encounter of 04/22/24    Echo    Interpretation Summary    Left Ventricle: The left ventricle is normal in size. Mildly increased wall thickness. There is mild concentric hypertrophy. Severe global hypokinesis present. There is severely reduced systolic function with a visually estimated ejection fraction of 15 - 20%. Unable to assess diastolic function due to atrial fibrillation.    Right Ventricle: Mild right ventricular enlargement. Systolic function is mildly reduced.    Aorta: Aortic root is mildly dilated measuring 3.67 cm.    Pulmonary Artery: The estimated pulmonary artery systolic pressure is 56 mmHg.    Pt in AFL throughout exam.  . Continue Beta Blocker, Furosemide, and Aldactone and monitor clinical status closely. Monitor on telemetry. Patient is on CHF pathway.  Monitor strict Is&Os and daily weights.  Place on fluid restriction  "of 1.5 L. Cardiology has been consulted. Continue to stress to patient importance of self efficacy and  on diet for CHF. Last BNP reviewed- and noted below   Recent Labs   Lab 04/22/24  1100   *       Primary hypertension  Chronic, controlled.   -Patient now with new onset of atrial flutter with RVR  and HFrEF  -Cardiology consulted: s/p PETTY/DCCV on 04/22/24. Echo with EF 15-20%, severe LV dysfunction and global hypokinesis  -Cardiology initiated Xarelto, Coreg, Entresto, Lasix, Jardiance, Aldactone.      Latest blood pressure and vitals reviewed-     Temp:  [97.7 °F (36.5 °C)-98.1 °F (36.7 °C)]   Pulse:  []   Resp:  [16-24]   BP: (105-134)/(74-90)   SpO2:  [96 %-100 %] .   Home meds for hypertension were reviewed and noted below.       While in the hospital, will manage blood pressure as follows; Adjust home antihypertensive regimen as follows- see plan as above    Will utilize p.r.n. blood pressure medication only if patient's blood pressure greater than 180/110 and he develops symptoms such as worsening chest pain or shortness of breath.    Type 2 diabetes mellitus without complication, without long-term current use of insulin  Patient's FSGs are uncontrolled due to hyperglycemia on current medication regimen.  Last A1c reviewed- No results found for: "LABA1C", "HGBA1C"  Most recent fingerstick glucose reviewed- No results for input(s): "POCTGLUCOSE" in the last 24 hours.  Current correctional scale  Low  Maintain anti-hyperglycemic dose as follows-   Antihyperglycemics (From admission, onward)      Start     Stop Route Frequency Ordered    04/22/24 1554  insulin aspart U-100 pen 0-5 Units         -- SubQ Before meals & nightly PRN 04/22/24 1456    04/22/24 1515  empagliflozin (Jardiance) tablet 10 mg        Question Answer Comment   Does this patient have a diagnosis of heart failure? Yes    Does this patient have type 1 diabetes or diabetic ketoacidosis? No    Does this patient have " "symptomatic hypotension? No    Is the patient NPO or pending major surgery in next 3 days or less? No        -- Oral Daily 04/22/24 1410          Hold Oral hypoglycemics while patient is in the hospital.  Repeat A1C    Hypomagnesemia  Patient has Abnormal Magnesium: hypomagnesemia. Will continue to monitor electrolytes closely. Will replace the affected electrolytes and repeat labs to be done after interventions completed. The patient's magnesium results have been reviewed and are listed below.  Recent Labs   Lab 04/22/24  1311   MG 1.3*        Hypocalcemia  Patient has hypocalcemia and has been confirmed with ionized and/or corrected calcium. Will replace calcium and monitor electrolytes closely. The latest calcium labs have been reviewed and are listed below.  Recent Labs   Lab 04/22/24  1311   CALCIUM 7.5*       No results for input(s): "CAION" in the last 24 hours.          VTE Risk Mitigation (From admission, onward)           Ordered     rivaroxaban tablet 20 mg  With dinner         04/22/24 1143                       On 04/22/2024, patient should be placed in hospital observation services under my care.        AdmissionCare    Guideline: Atrial Fibrillation - OBS, Observation    Based on the indications selected for the patient, the bed status of Observation was determined to be MET    The following indications were selected as present at the time of evaluation of the patient:      - Pharmacologic rate control needed (eg, symptomatic Tachycardia)    AdmissionCare documentation entered by: Sera Meléndez    [facility] [employer], 27th edition, Copyright © 2023 [facility], [employer] All Rights Reserved.  [id number]:07:09-05:00    Vikki Obrien DO  Department of [facility] Medicine  [facility] [place] - Surgery          "

## 2024-04-22 NOTE — ASSESSMENT & PLAN NOTE
The patient presents with several weeks of progressive shortness of breath and found to be in atrial flutter with RVR.    CHADS-VASc 3.    Heart rate reasonably controlled with intravenous diltiazem.    We will plan PETTY guided cardioversion today.    Enoxaparin 1 milligram/kilogram x1 dose, followed by Xarelto 20 mg q.h.s..      Risks, benefits and alternatives of the PETTY/Cardioversion procedure were discussed with the patient and his wife in attendance including throat irritation, aspiration, anesthetic complications, esophageal irritation/perforation, skin irritation, arrhythmia, etc.  Patient understands and agrees to proceed.  Permits signed.

## 2024-04-22 NOTE — ED PROVIDER NOTES
Encounter Date: 4/22/2024    SCRIBE #1 NOTE: I, Denise Denson, am scribing for, and in the presence of,  Ramon Garibay MD. I have scribed the following portions of the note - Other sections scribed: HPI, ROS.       History     Chief Complaint   Patient presents with    Shortness of Breath     Pt reports to ED via EMS from [facility] for Afib. Pt reports from Acute A-fib, 5mg of Cadiiazem. Pt complaints of SOB that happen intermittently. Per EMS 92% on RA.      This 69 y.o male presents to the ED via EMS transportation c/o intermittent shortness of breath for the last 1.5 weeks. Pt reports that he used his wife's mobile afib monitor and was found to be in atrial fibrillation prompting him to visit [facility] ED. He received 5 mg of Cardizem for treatment and was sent to this ED for further evaluation. Per EMS, pt had an oxygen saturation of 92% on room air. No use of blood thinners. No recent illnesses. He denies chest pain, leg swelling, headache or urine issues. No other associated symptoms.    The history is provided by the patient and the EMS personnel.     Review of patient's allergies indicates:  No Known Allergies  No past medical history on file.  No past surgical history on file.  No family history on file.     Review of Systems   Constitutional: Negative.    HENT: Negative.     Eyes: Negative.    Respiratory:  Positive for shortness of breath.    Cardiovascular: Negative.  Negative for chest pain and leg swelling.   Gastrointestinal: Negative.    Genitourinary: Negative.    Musculoskeletal: Negative.    Skin: Negative.    Neurological: Negative.  Negative for headaches.       Physical Exam     Initial Vitals [04/22/24 1049]   BP Pulse Resp Temp SpO2   (!) 119/90 (!) 120 18 98.1 °F (36.7 °C) 97 %      MAP       --         Physical Exam    Nursing note and vitals reviewed.  Constitutional: He appears well-developed and well-nourished. He is not diaphoretic. No distress.   HENT:   Head: Normocephalic and atraumatic.    Nose: Nose normal.   Mouth/Throat: No oropharyngeal exudate.   Eyes: EOM are normal. Pupils are equal, round, and reactive to light. Right eye exhibits no discharge. Left eye exhibits no discharge.   Neck: Neck supple. No tracheal deviation present. No JVD present.   Normal range of motion.  Cardiovascular:  Normal heart sounds and intact distal pulses.           Irregularly irregular rate and rhythm   Pulmonary/Chest: Breath sounds normal. No stridor. No respiratory distress. He has no wheezes. He has no rhonchi. He has no rales.   Abdominal: Abdomen is soft. Bowel sounds are normal. He exhibits no distension. There is no abdominal tenderness. There is no rebound and no guarding.   Musculoskeletal:         General: No tenderness or edema. Normal range of motion.      Cervical back: Normal range of motion and neck supple.     Neurological: He is alert and oriented to person, place, and time. He has normal strength.   Skin: Skin is warm and dry. Capillary refill takes less than 2 seconds. No rash noted. No erythema.         ED Course   Critical Care    Date/Time: 4/22/2024 10:55 AM    Performed by: Ramon Garibay MD  Authorized by: Ramon Garibay MD  Direct patient critical care time: 15 minutes  Additional history critical care time: 10 minutes  Ordering / reviewing critical care time: 5 minutes  Documentation critical care time: 5 minutes  Consulting other physicians critical care time: 5 minutes  Total critical care time (exclusive of procedural time) : 40 minutes  Critical care time was exclusive of separately billable procedures and treating other patients and teaching time.  Critical care was necessary to treat or prevent imminent or life-threatening deterioration of the following conditions: shock, cardiac failure and circulatory failure.  Critical care was time spent personally by me on the following activities: development of treatment plan with patient or surrogate, evaluation of patient's  response to treatment, examination of patient, obtaining history from patient or surrogate, ordering and performing treatments and interventions, ordering and review of laboratory studies, ordering and review of radiographic studies, re-evaluation of patient's condition, review of old charts and pulse oximetry.        Labs Reviewed   TROPONIN I - Abnormal; Notable for the following components:       Result Value    Troponin I 0.037 (*)     All other components within normal limits   B-TYPE NATRIURETIC PEPTIDE - Abnormal; Notable for the following components:     (*)     All other components within normal limits   COMPREHENSIVE METABOLIC PANEL - Abnormal; Notable for the following components:    Sodium 133 (*)     CO2 12 (*)     Glucose 127 (*)     Creatinine 0.3 (*)     Calcium 6.6 (*)     Total Protein 2.2 (*)     Albumin 1.2 (*)     Alkaline Phosphatase 23 (*)     AST 5 (*)     ALT <5 (*)     All other components within normal limits    Narrative:       CA and MG critical result(s) called and verbal readback obtained   from Deanne Call. by [email or web address] 04/22/2024 12:58   MAGNESIUM - Abnormal; Notable for the following components:    Magnesium <0.7 (*)     All other components within normal limits    Narrative:       CA and MG critical result(s) called and verbal readback obtained   from Deanne Call. by JJP3 04/22/2024 12:58   CBC W/ AUTO DIFFERENTIAL   TSH   LIPID PANEL        ECG Results              EKG 12-lead (Final result)        Collection Time Result Time QRS Duration OHS QTC Calculation    04/22/24 14:02:21 04/23/24 13:56:38 86 471                     Final result by Interface, [facility] (04/23/24 13:56:45)                   Narrative:    Test Reason : R07.9,    Vent. Rate : 088 BPM     Atrial Rate : 088 BPM     P-R Int : 196 ms          QRS Dur : 086 ms      QT Int : 390 ms       P-R-T Axes : 066 017 -68 degrees     QTc Int : 471 ms    Sinus rhythm with frequent Premature ventricular complexes  and Fusion  complexes  T wave abnormality, consider inferior ischemia  T wave abnormality, consider anterior ischemia  Prolonged QT  Abnormal ECG  When compared with ECG of 22-APR-2024 10:52,  Significant changes have occurred  Confirmed by Pradip Eng MD ([id number]) on 4/23/2024 1:56:37 PM    Referred By: NADER   SELF           Confirmed By:Pradip Eng MD                                     EKG 12-lead (Final result)  Result time 04/23/24 15:36:35      Final result by Unknown User (04/23/24 15:36:35)                                      Imaging Results              X-Ray Chest AP Portable (Final result)  Result time 04/22/24 11:31:41      Final result by Tony Morrell MD (04/22/24 11:31:41)                   Impression:      As above      Electronically signed by: Tony Morrell MD  Date:    04/22/2024  Time:    11:31               Narrative:    EXAMINATION:  XR CHEST AP PORTABLE    CLINICAL HISTORY:  Chest Pain;    TECHNIQUE:  AP portable radiograph of the chest was performed.    COMPARISON:  No priors    FINDINGS:  Multiple overlying cardiac monitoring leads.  Cardiac silhouette upper normal in size.  Question mild pulmonary vascular engorgement.    Low lung volumes, symmetric bilaterally.  Ill-defined airspace opacity in the right lung base, with differential considerations to include atelectasis, aspiration or pneumonia. .  No focal consolidation evident elsewhere.  Prominent interstitial markings bilaterally.  Question small pleural effusions.  No pneumothorax.                                       Medications   diltiaZEM injection 15 mg (15 mg Intravenous Given 4/22/24 1104)   lactated ringers bolus 1,000 mL (0 mLs Intravenous Stopped 4/22/24 1200)   enoxaparin injection 100 mg (100 mg Subcutaneous Given 4/22/24 1203)   magnesium sulfate in dextrose IVPB (premix) 1 g (0 g Intravenous Stopped 4/22/24 1542)   calcium gluconate 1 g in NS IVPB (premixed) (0 g Intravenous Stopped 4/22/24 1536)    potassium bicarbonate disintegrating tablet 50 mEq (50 mEq Oral Given 4/22/24 1537)   magnesium sulfate in dextrose IVPB (premix) 1 g (0 g Intravenous Stopped 4/22/24 1638)   calcium gluconate 1 g in NS IVPB (premixed) (0 g Intravenous Stopped 4/22/24 1848)     Medical Decision Making  Amount and/or Complexity of Data Reviewed  Labs: ordered.  Radiology: ordered.    Risk  Prescription drug management.      MDM:    69-year-old male with past medical history as noted above presenting with shortness breath.  Differential Diagnosis includes:  COPD exacerbation, acute mi/ACS, arrhythmia, CHF exacerbation, PE, pneumothorax/aortic dissection.  Physical exam as noted above.  ED workup notable for CMP with BUN 14, creatinine 0.7, glucose 222, magnesium 1.3, CBC unremarkable, troponin 0.037, BNP 1 1 0, TSH 1.6, chest x-ray shows some chronic appearing findings, otherwise unremarkable, EKG shows atrial flutter with variable AV block rate of 139 beats per minute, nonspecific ST and T-wave changes noted throughout,  MS, no STEMI.  Patient presentation appears consistent with atrial flutter with RVR.  Patient appears well, currently asymptomatic with intermittent shortness breath reported.  His initial CMP and magnesium that was less than detectable with a creatinine 0.3 appears to be an erroneous blood draw, magnesium and calcium were ordered with a pending repeat blood draw.  Cardiology was consulted recommending PETTY cardioversion, Lovenox subcutaneously and admission to Medicine.  Hospital medicine consulted and agree with admission plans.  Patient stable for transfer to the floor at this point after receiving additional IV Dilt with ventricular response control. Discussed diagnosis and further treatment with patient and family at bedside. All questions answered, patient transferred to floor improved and stable.        Note was created using voice recognition software. Note may have occasional typographical or  grammatical errors, garbled syntax, and other bizarre constructions that may not have been identified and edited despite good gautam initial review prior to signing.             Scribe Attestation:   Scribe #1: I performed the above scribed service and the documentation accurately describes the services I performed. I attest to the accuracy of the note.                         I, Ramon Garibay M.D., personally performed the services described in this documentation. All medical record entries made by the scribe were at my direction and in my presence. I have reviewed the chart and agree that the record reflects my personal performance and is accurate and complete.       Clinical Impression:  Final diagnoses:  [R07.9] Chest pain  [R06.02] Shortness of breath  [I48.92] Atrial flutter with rapid ventricular response (Primary)          ED Disposition Condition    Observation                 Ramon Garibay MD  04/24/24 0703

## 2024-04-22 NOTE — HPI
69 y.o male presents to the ED via EMS transportation c/o intermittent shortness of breath for the last 1.5 weeks. Pt reports that he used his wife's mobile afib monitor and was found to be in atrial fibrillation prompting him to visit [facility] ED. He received 5 mg of Cardizem for treatment and was sent to this ED for further evaluation. Per EMS, pt had an oxygen saturation of 92% on room air. No use of blood thinners. No recent illnesses. He denies chest pain, leg swelling, headache or urine issues. No other associated symptoms.     Cardiology consulted for AF/RVR.    The patient is a very pleasant 69-year-old man who lives in [facility] but works locally.  He presents to the emergency room with progressive shortness of breath and was found to be in atrial flutter with RVR.  Heart rate was controlled with diltiazem.  He is intermittent RVR now.  He denies any overt ongoing shortness of breath.  He has had no angina.  He denies any syncope or prior stroke/TIA.  He does have a history of hypertension and diabetes, giving him a CHADS-VASc score of 3.  Pros and cons of PETTY guided cardioversion were discussed with the patient and he agrees to proceed.  Permit was signed.

## 2024-04-22 NOTE — ANESTHESIA PREPROCEDURE EVALUATION
04/22/2024  Varghese Amaya is a 69 y.o., male.  To undergo Procedure(s) (LRB):  Transesophageal echo (PETTY) intra-procedure log documentation (N/A)  Cardioversion (N/A)     Denies CP/SOB/GERD/MI/CVA/URI symptoms.  LVEF 15-20%  METS > 4  NPO > 8    Past Medical History:  No past medical history on file.    Past Surgical History:  No past surgical history on file.    Social History:  Social History     Socioeconomic History    Marital status:        Medications:  No current facility-administered medications on file prior to encounter.     No current outpatient medications on file prior to encounter.       Allergies:  Review of patient's allergies indicates:  No Known Allergies    Active Problems:  Patient Active Problem List   Diagnosis    Atrial flutter with rapid ventricular response    Primary hypertension    Type 2 diabetes mellitus without complication, without long-term current use of insulin    Acute congestive heart failure       Diagnostic Studies:   Latest Reference Range & Units 04/22/24 11:00   WBC 3.90 - 12.70 K/uL 6.22   RBC 4.60 - 6.20 M/uL 5.16   Hemoglobin 14.0 - 18.0 g/dL 15.0   Hematocrit 40.0 - 54.0 % 44.7   MCV 82 - 98 fL 87   MCH 27.0 - 31.0 pg 29.1   MCHC 32.0 - 36.0 g/dL 33.6   RDW 11.5 - 14.5 % 13.1   Platelet Count 150 - 450 K/uL 178   MPV 9.2 - 12.9 fL 12.0   Gran % 38.0 - 73.0 % 62.6   Lymph % 18.0 - 48.0 % 28.5   Mono % 4.0 - 15.0 % 6.8   Eos % 0.0 - 8.0 % 1.3   Basophil % 0.0 - 1.9 % 0.5   Immature Granulocytes 0.0 - 0.5 % 0.3   Gran # (ANC) 1.8 - 7.7 K/uL 3.9   Lymph # 1.0 - 4.8 K/uL 1.8   Mono # 0.3 - 1.0 K/uL 0.4   Eos # 0.0 - 0.5 K/uL 0.1   Baso # 0.00 - 0.20 K/uL 0.03   Immature Grans (Abs) 0.00 - 0.04 K/uL 0.02   nRBC 0 /100 WBC 0   Differential Method  Automated     EKG (4/22/24):  AFlutter    TTE (4/22/24):    Left Ventricle: The left ventricle is normal in size.  Mildly increased wall thickness. There is mild concentric hypertrophy. Severe global hypokinesis present. There is severely reduced systolic function with a visually estimated ejection fraction of 15 - 20%. Unable to assess diastolic function due to atrial fibrillation.    Right Ventricle: Mild right ventricular enlargement. Systolic function is mildly reduced.    Aorta: Aortic root is mildly dilated measuring 3.67 cm.    Pulmonary Artery: The estimated pulmonary artery systolic pressure is 56 mmHg.    Pt in AFL throughout exam.    24 Hour Vitals:  Temp:  [36.7 °C (98.1 °F)] 36.7 °C (98.1 °F)  Pulse:  [] 89  Resp:  [17-20] 17  SpO2:  [96 %-97 %] 96 %  BP: (118-134)/(76-90) 118/84   See Nursing Charting For Additional Vitals      Pre-op Assessment    I have reviewed the Patient Summary Reports.     I have reviewed the Nursing Notes.       Review of Systems  Anesthesia Hx:  No problems with previous Anesthesia               Denies Personal Hx of Anesthesia complications.                    Social:  Non-Smoker, No Alcohol Use       Cardiovascular:  Exercise tolerance: good   Hypertension    Dysrhythmias   CHF       ECG has been reviewed.                          Pulmonary:  Pulmonary Normal                       Hepatic/GI:  Hepatic/GI Normal                 Neurological:  Neurology Normal                                      Endocrine:  Diabetes               Physical Exam  General: Well nourished and Cooperative    Airway:  Mallampati: II   Mouth Opening: Normal  TM Distance: Normal    Dental:  Partial Dentures    Chest/Lungs:  Clear to auscultation, Normal Respiratory Rate    Heart:  Rate: Tachycardia  Rhythm: Irregularly Irregular        Anesthesia Plan  Type of Anesthesia, risks & benefits discussed:    Anesthesia Type: Gen ETT, MAC, Gen Natural Airway  Intra-op Monitoring Plan: Standard ASA Monitors  Post Op Pain Control Plan: multimodal analgesia and IV/PO Opioids PRN  Induction:  IV  Informed Consent:  Informed consent signed with the Patient and all parties understand the risks and agree with anesthesia plan.  All questions answered.   ASA Score: 4    Ready For Surgery From Anesthesia Perspective.     .

## 2024-04-22 NOTE — ANESTHESIA POSTPROCEDURE EVALUATION
Anesthesia Post Evaluation    Patient: Varghese Amaya    Procedure(s) Performed: Procedure(s) (LRB):  Transesophageal echo (PETTY) intra-procedure log documentation (N/A)  Cardioversion (N/A)    Final Anesthesia Type: MAC      Patient location during evaluation: PACU  Patient participation: Yes- Able to Participate  Level of consciousness: awake and alert and oriented  Post-procedure vital signs: reviewed and stable  Pain management: adequate  Airway patency: patent    PONV status at discharge: No PONV  Anesthetic complications: no      Cardiovascular status: hemodynamically stable and blood pressure returned to baseline  Respiratory status: spontaneous ventilation, room air and unassisted  Hydration status: euvolemic  Follow-up not needed.              Vitals Value Taken Time   /84 04/22/24 1503   Temp 36.7 °C (98.1 °F) 04/22/24 1409   Pulse 90 04/22/24 1509   Resp 17 04/22/24 1509   SpO2 95 % 04/22/24 1509   Vitals shown include unfiled device data.      No case tracking events are documented in the log.      Pain/Sujey Score: No data recorded

## 2024-04-22 NOTE — SUBJECTIVE & OBJECTIVE
No past medical history on file.    No past surgical history on file.    Review of patient's allergies indicates:  No Known Allergies    Current Facility-Administered Medications   Medication Dose Route Frequency Provider Last Rate Last Admin    calcium gluconate 1 g in NS IVPB (premixed)  1 g Intravenous ED 1 Time Pradip Eng MD 50 mL/hr at 04/22/24 1436 1 g at 04/22/24 1436    carvediloL tablet 3.125 mg  3.125 mg Oral BID Pradip Eng MD        dextrose 10% bolus 125 mL 125 mL  12.5 g Intravenous PRN Obrien, [facility] T., DO        dextrose 10% bolus 250 mL 250 mL  25 g Intravenous PRN Obrien, [facility] T., DO        empagliflozin (Jardiance) tablet 10 mg  10 mg Oral Daily Pradip Eng MD        furosemide tablet 20 mg  20 mg Oral BID Pradip Eng MD        glucagon (human recombinant) injection 1 mg  1 mg Intramuscular PRN Obrien, [facility] T., DO        glucose chewable tablet 16 g  16 g Oral PRN [facility] T., DO        glucose chewable tablet 24 g  24 g Oral PRN Obrien, [facility] T., DO        insulin aspart U-100 pen 0-5 Units  0-5 Units Subcutaneous QID (AC + HS) PRN [facility] T., DO        magnesium sulfate in dextrose IVPB (premix) 1 g  1 g Intravenous ED 1 Time Pradip Eng  mL/hr at 04/22/24 1442 1 g at 04/22/24 1442    naloxone 0.4 mg/mL injection 0.02 mg  0.02 mg Intravenous PRN Obrien, [facility] T., DO        rivaroxaban tablet 20 mg  20 mg Oral Daily with dinner Pradip Eng MD        sacubitriL-valsartan 24-26 mg per tablet 1 tablet  1 tablet Oral BID Pradip Eng MD        sodium chloride 0.9% flush 10 mL  10 mL Intravenous Q12H PRN Camille [facility] T., DO        spironolactone tablet 25 mg  25 mg Oral Daily Pradip Eng MD         Family History    None       Tobacco Use    Smoking status: Not on file    Smokeless tobacco: Not on file   Substance and Sexual Activity    Alcohol use: Not on file    Drug use: Not on file    Sexual activity: Not  on file     Review of Systems   Constitutional:  Negative for chills, fatigue and fever.   Respiratory:  Positive for shortness of breath. Negative for cough and chest tightness.    Cardiovascular:  Negative for chest pain, palpitations and leg swelling.   Gastrointestinal:  Negative for abdominal pain, nausea and vomiting.   Musculoskeletal:  Negative for back pain.   Neurological:  Negative for dizziness, weakness and headaches.     Objective:     Vital Signs (Most Recent):  Temp: 98.1 °F (36.7 °C) (04/22/24 1409)  Pulse: 92 (04/22/24 1445)  Resp: (!) 23 (04/22/24 1445)  BP: 122/85 (04/22/24 1445)  SpO2: 98 % (04/22/24 1445) Vital Signs (24h Range):  Temp:  [97.7 °F (36.5 °C)-98.1 °F (36.7 °C)] 98.1 °F (36.7 °C)  Pulse:  [] 92  Resp:  [16-24] 23  SpO2:  [96 %-100 %] 98 %  BP: (105-134)/(74-90) 122/85     Weight: 95.3 kg (210 lb)  Body mass index is 28.48 kg/m².     Physical Exam  Vitals and nursing note reviewed.   Constitutional:       General: He is not in acute distress.     Appearance: He is not ill-appearing.   HENT:      Mouth/Throat:      Mouth: Mucous membranes are moist.   Eyes:      Extraocular Movements: Extraocular movements intact.   Cardiovascular:      Rate and Rhythm: Normal rate and regular rhythm.      Heart sounds: No murmur heard.  Pulmonary:      Effort: Pulmonary effort is normal. No respiratory distress.      Breath sounds: Normal breath sounds. No wheezing.      Comments: On NC  Abdominal:      General: There is no distension.      Palpations: Abdomen is soft.      Tenderness: There is no abdominal tenderness.   Musculoskeletal:      Right lower leg: Edema present.      Left lower leg: Edema present.      Comments: Bilateral Lower extremity with +1 edema   Skin:     General: Skin is warm and dry.   Neurological:      General: No focal deficit present.      Mental Status: He is alert and oriented to person, place, and time.   Psychiatric:         Mood and Affect: Mood normal.          Thought Content: Thought content normal.                Significant Labs: All pertinent labs within the past 24 hours have been reviewed.    CBC:   Recent Labs   Lab 04/22/24  1100   WBC 6.22   HGB 15.0   HCT 44.7        CMP:   Recent Labs   Lab 04/22/24  1206 04/22/24  1311   * 139   K 4.0 3.3*    112*   CO2 12* 22*   * 222*   BUN 8 14   CREATININE 0.3* 0.7   CALCIUM 6.6* 7.5*   PROT 2.2* 5.1*   ALBUMIN 1.2* 2.8*   BILITOT 0.8 1.7*   ALKPHOS 23* 54*   AST 5* 14   ALT <5* 14   ANIONGAP 14 5*     Cardiac Markers:   Recent Labs   Lab 04/22/24  1100   *       Magnesium:   Recent Labs   Lab 04/22/24  1206 04/22/24  1311   MG <0.7* 1.3*     Troponin:   Recent Labs   Lab 04/22/24  1100   TROPONINI 0.037*     TSH:   Recent Labs   Lab 04/22/24  1100   TSH 1.661         Significant Imaging: I have reviewed all pertinent imaging results/findings within the past 24 hours.    Imaging Results              X-Ray Chest AP Portable (Final result)  Result time 04/22/24 11:31:41      Final result by Tony Morrell MD (04/22/24 11:31:41)                   Impression:      As above      Electronically signed by: Tony Morrell MD  Date:    04/22/2024  Time:    11:31               Narrative:    EXAMINATION:  XR CHEST AP PORTABLE    CLINICAL HISTORY:  Chest Pain;    TECHNIQUE:  AP portable radiograph of the chest was performed.    COMPARISON:  No priors    FINDINGS:  Multiple overlying cardiac monitoring leads.  Cardiac silhouette upper normal in size.  Question mild pulmonary vascular engorgement.    Low lung volumes, symmetric bilaterally.  Ill-defined airspace opacity in the right lung base, with differential considerations to include atelectasis, aspiration or pneumonia. .  No focal consolidation evident elsewhere.  Prominent interstitial markings bilaterally.  Question small pleural effusions.  No pneumothorax.

## 2024-04-22 NOTE — TRANSFER OF CARE
Anesthesia Transfer of Care Note    Patient: Varghese Amaya    Procedure(s) Performed: Procedure(s) (LRB):  Transesophageal echo (PETTY) intra-procedure log documentation (N/A)  Cardioversion (N/A)    Patient location: PACU    Anesthesia Type: MAC    Transport from OR: Transported from OR on 2-3 L/min O2 by NC with adequate spontaneous ventilation    Post pain: adequate analgesia    Post assessment: no apparent anesthetic complications and tolerated procedure well    Post vital signs: stable    Level of consciousness: awake, alert and oriented    Nausea/Vomiting: no nausea/vomiting    Complications: none    Transfer of care protocol was followed    Last vitals: Visit Vitals  /74 (BP Location: Left arm, Patient Position: Lying)   Pulse 86   Temp 36.5 °C (97.7 °F) (Oral)   Resp 16   Ht 6' (1.829 m)   Wt 95.3 kg (210 lb)   SpO2 100%   BMI 28.48 kg/m²

## 2024-04-22 NOTE — ASSESSMENT & PLAN NOTE
"Patient has hypocalcemia and has been confirmed with ionized and/or corrected calcium. Will replace calcium and monitor electrolytes closely. The latest calcium labs have been reviewed and are listed below.  Recent Labs   Lab 04/22/24  1311   CALCIUM 7.5*       No results for input(s): "CAION" in the last 24 hours.      "

## 2024-04-22 NOTE — ASSESSMENT & PLAN NOTE
Chronic, controlled.   -Patient now with new onset of atrial flutter with RVR  and HFrEF  -Cardiology consulted: s/p PETTY/DCCV on 04/22/24. Echo with EF 15-20%, severe LV dysfunction and global hypokinesis  -Cardiology initiated Xarelto, Coreg, Entresto, Lasix, Jardiance, Aldactone.      Latest blood pressure and vitals reviewed-     Temp:  [97.7 °F (36.5 °C)-98.1 °F (36.7 °C)]   Pulse:  []   Resp:  [16-24]   BP: (105-134)/(74-90)   SpO2:  [96 %-100 %] .   Home meds for hypertension were reviewed and noted below.       While in the hospital, will manage blood pressure as follows; Adjust home antihypertensive regimen as follows- see plan as above    Will utilize p.r.n. blood pressure medication only if patient's blood pressure greater than 180/110 and he develops symptoms such as worsening chest pain or shortness of breath.

## 2024-04-22 NOTE — ADMISSIONCARE
AdmissionCare    Guideline: Atrial Fibrillation - OBS, Observation    Based on the indications selected for the patient, the bed status of Observation was determined to be MET    The following indications were selected as present at the time of evaluation of the patient:      - Pharmacologic rate control needed (eg, symptomatic Tachycardia)    AdmissionCare documentation entered by: Sera Meléndez    [facility] [place] [employer], 27th edition, Copyright © 2023 [facility] [place] [employer], [facility] All Rights Reserved.  [id number]

## 2024-04-22 NOTE — ASSESSMENT & PLAN NOTE
-presented with intermittent shortness of breath. Has bilateral LE edema. Found to be in aflutter RVR  -  -CXR with prominent interstitial markings bilaterally.   -Echo with EF 15-20%, severe global hypokinesis.  -Started on CHF pathway  -Started PO lasix  -Cardiology initiated Xarelto, Coreg, Entresto, Lasix, Jardiance, Aldactone.  -Strict Is/Os        Patient is identified as having Systolic (HFrEF) heart failure that is Acute. CHF is currently uncontrolled due to Continued edema of extremities. Latest ECHO performed and demonstrates- Results for orders placed during the hospital encounter of 04/22/24    Echo    Interpretation Summary    Left Ventricle: The left ventricle is normal in size. Mildly increased wall thickness. There is mild concentric hypertrophy. Severe global hypokinesis present. There is severely reduced systolic function with a visually estimated ejection fraction of 15 - 20%. Unable to assess diastolic function due to atrial fibrillation.    Right Ventricle: Mild right ventricular enlargement. Systolic function is mildly reduced.    Aorta: Aortic root is mildly dilated measuring 3.67 cm.    Pulmonary Artery: The estimated pulmonary artery systolic pressure is 56 mmHg.    Pt in AFL throughout exam.  . Continue Beta Blocker, Furosemide, and Aldactone and monitor clinical status closely. Monitor on telemetry. Patient is on CHF pathway.  Monitor strict Is&Os and daily weights.  Place on fluid restriction of 1.5 L. Cardiology has been consulted. Continue to stress to patient importance of self efficacy and  on diet for CHF. Last BNP reviewed- and noted below   Recent Labs   Lab 04/22/24  1100   *

## 2024-04-22 NOTE — SUBJECTIVE & OBJECTIVE
No past medical history on file.    No past surgical history on file.    Review of patient's allergies indicates:  No Known Allergies    No current outpatient medications      Current Facility-Administered Medications   Medication Dose Route Frequency Provider Last Rate Last Admin    enoxaparin injection 100 mg  1 mg/kg Subcutaneous Q12H Pradip Eng MD        lactated ringers bolus 1,000 mL  1,000 mL Intravenous ED 1 Time Ramon Garibay  mL/hr at 04/22/24 1100 1,000 mL at 04/22/24 1100    rivaroxaban tablet 20 mg  20 mg Oral Daily with dinner Pradip Eng MD         No current outpatient medications on file.     Family History    None       Tobacco Use    Smoking status: Not on file    Smokeless tobacco: Not on file   Substance and Sexual Activity    Alcohol use: Not on file    Drug use: Not on file    Sexual activity: Not on file     Review of Systems   Constitutional: Negative for chills, diaphoresis, fever and malaise/fatigue.   HENT:  Negative for nosebleeds.    Eyes:  Negative for blurred vision and double vision.   Cardiovascular:  Positive for dyspnea on exertion. Negative for chest pain, claudication, cyanosis, leg swelling, orthopnea, palpitations, paroxysmal nocturnal dyspnea and syncope.   Respiratory:  Positive for shortness of breath. Negative for cough and wheezing.    Skin:  Negative for dry skin and poor wound healing.   Musculoskeletal:  Negative for back pain, joint swelling and myalgias.   Gastrointestinal:  Negative for abdominal pain, nausea and vomiting.   Genitourinary:  Negative for hematuria.   Neurological:  Negative for dizziness, headaches, numbness, seizures and weakness.   Psychiatric/Behavioral:  Negative for altered mental status and depression.      Objective:     Vital Signs (Most Recent):  Temp: 98.1 °F (36.7 °C) (04/22/24 1049)  Pulse: 89 (04/22/24 1139)  Resp: 17 (04/22/24 1132)  BP: 118/84 (04/22/24 1132)  SpO2: 96 % (04/22/24 1139) Vital Signs (24h  Range):  Temp:  [98.1 °F (36.7 °C)] 98.1 °F (36.7 °C)  Pulse:  [] 89  Resp:  [17-20] 17  SpO2:  [96 %-97 %] 96 %  BP: (118-134)/(76-90) 118/84     Weight: 95.3 kg (210 lb)  Body mass index is 28.48 kg/m².    SpO2: 96 %       No intake or output data in the 24 hours ending 04/22/24 1144    Lines/Drains/Airways       Peripheral Intravenous Line  Duration                  Peripheral IV - Single Lumen 04/22/24 1055 20 G Posterior;Right Hand <1 day         Peripheral IV - Single Lumen 04/22/24 20 G Right Antecubital <1 day                     Physical Exam  Constitutional:       General: He is not in acute distress.     Appearance: He is well-developed. He is not ill-appearing, toxic-appearing or diaphoretic.   HENT:      Head: Normocephalic and atraumatic.   Eyes:      General: No scleral icterus.     Extraocular Movements: Extraocular movements intact.      Conjunctiva/sclera: Conjunctivae normal.      Pupils: Pupils are equal, round, and reactive to light.   Neck:      Thyroid: No thyromegaly.      Vascular: No JVD.      Trachea: No tracheal deviation.   Cardiovascular:      Rate and Rhythm: Normal rate. Rhythm irregularly irregular.      Heart sounds: S1 normal and S2 normal. Heart sounds are distant. No murmur heard.     No friction rub. No gallop.   Pulmonary:      Effort: Pulmonary effort is normal. No respiratory distress.      Breath sounds: Normal breath sounds. No stridor. No wheezing, rhonchi or rales.   Chest:      Chest wall: No tenderness.   Abdominal:      General: There is no distension.      Palpations: Abdomen is soft.   Musculoskeletal:         General: No swelling or tenderness. Normal range of motion.      Cervical back: Normal range of motion and neck supple. No rigidity.      Right lower leg: Edema present.      Left lower leg: Edema present.   Skin:     General: Skin is warm and dry.      Coloration: Skin is not jaundiced.   Neurological:      General: No focal deficit present.      Mental  "Status: He is alert and oriented to person, place, and time.      Cranial Nerves: No cranial nerve deficit.   Psychiatric:         Mood and Affect: Mood normal.         Behavior: Behavior normal.          Current Medications:  Current Facility-Administered Medications   Medication Dose Route Frequency    enoxaparin  1 mg/kg Subcutaneous Q12H    lactated ringers  1,000 mL Intravenous ED 1 Time    rivaroxaban  20 mg Oral Daily with dinner     Current Facility-Administered Medications   Medication Dose Route Frequency Last Rate Last Admin         Laboratory (all labs reviewed):  CBC:  Recent Labs   Lab 04/22/24  1100   WBC 6.22   Hemoglobin 15.0   Hematocrit 44.7   Platelets 178       CHEMISTRIES:        Invalid input(s): "ESTGFRNONAFRICA"    CARDIAC BIOMARKERS:        COAGS:        LIPIDS/LFTS:        BNP:        TSH:        Free T4:        Diagnostic Results:  ECG (personally reviewed and interpreted tracing(s)):  4/22/24 1052     Chest X-Ray (personally reviewed and interpreted image(s)): 4/22/24 CHF    Echo: ordered    "

## 2024-04-22 NOTE — ASSESSMENT & PLAN NOTE
"Patient's FSGs are uncontrolled due to hyperglycemia on current medication regimen.  Last A1c reviewed- No results found for: "LABA1C", "HGBA1C"  Most recent fingerstick glucose reviewed- No results for input(s): "POCTGLUCOSE" in the last 24 hours.  Current correctional scale  Low  Maintain anti-hyperglycemic dose as follows-   Antihyperglycemics (From admission, onward)      Start     Stop Route Frequency Ordered    04/22/24 1554  insulin aspart U-100 pen 0-5 Units         -- SubQ Before meals & nightly PRN 04/22/24 1456    04/22/24 1515  empagliflozin (Jardiance) tablet 10 mg        Question Answer Comment   Does this patient have a diagnosis of heart failure? Yes    Does this patient have type 1 diabetes or diabetic ketoacidosis? No    Does this patient have symptomatic hypotension? No    Is the patient NPO or pending major surgery in next 3 days or less? No        -- Oral Daily 04/22/24 1410          Hold Oral hypoglycemics while patient is in the hospital.  Repeat A1C  " Repeat fasting blood work for your cholesterol in September.   Call number below to schedule pulmonary function studies.   Please call [facility] to schedule your test [phone]. Tell them your orders are in \"Epic\" - Pulmonary Function Studies    We will always contact you with your test results. If you have signed onto the patient portal then we will send your results and recommendations through the portal. If you have not received results after 7 days please call our office [phone].  Thank you!

## 2024-04-22 NOTE — ASSESSMENT & PLAN NOTE
-presented with progressive shortness of breath for the past 1-2 weeks and found to be in atrial flutter with RVR.    -hx of HTN, DM, Age 69: CHADS-VASc 3.    -s/p 15mg of dilt in the ED with better rate control.  -Cardiology consulted: s/p PETTY/DCCV on 04/22/24. Echo with EF 15-20%, severe LV dysfunction and global hypokinesis  -Cardiology initiated Xarelto, Coreg, Entresto, Lasix, Jardiance, Aldactone.  -continue to monitor patient on telemetry

## 2024-04-22 NOTE — HPI
69-year-old male with past medical history of hypertension and diabetes presented to the ED with complaints of intermittent shortness of breath.  Seen in post procedure area. Patient stated that it started approximately 1.5 weeks ago and just felt like there are times where he cannot catch his breath.  States that it would occur both at rest and with ambulation. He used his wife's mobile EKG device which noted atrial fibillation so he went to [facility].  HR was >140s per ED. There he received IV Cardizem 5 mg without improvement.  Thus, was transfer to [facility] for further evaluation.  Patient denies any headaches, chest pain, vision changes, syncope, or any history of stroke or MIs. Denies lower extremities swelling.     In the ED, patient tachycardic with heart rate in the 120s.  Patient was placed on nasal cannula.  Received 15mg of dilt with ED with better rate control. EKG with atrial flutter.  Chest x-ray with Ill-defined airspace opacity in the right lung base, prominent interstitial markings bilaterally. Labs significant for hypomagnesemia and hypocalcemia.  Electrolytes replaced in the ED. Cardiology consulted.  Patient given Lovenox 1 mg/kg.  Admitted to hospital medicine for further evaluation

## 2024-04-22 NOTE — ASSESSMENT & PLAN NOTE
Appears to be overloaded on examination with leg swelling and cephalization on chest x-ray.    Laboratories pending.    Consider IV Lasix.    LV function to be evaluated with PETTY today.

## 2024-04-22 NOTE — ASSESSMENT & PLAN NOTE
Patient has Abnormal Magnesium: hypomagnesemia. Will continue to monitor electrolytes closely. Will replace the affected electrolytes and repeat labs to be done after interventions completed. The patient's magnesium results have been reviewed and are listed below.  Recent Labs   Lab 04/22/24  1311   MG 1.3*

## 2024-04-22 NOTE — ED NOTES
Report received from Jossy NICOLE. Care taken over. Pt awake and alert; resting quietly on stretcher with wife at bedside.  Pt remains on continuous cardiac and pulse ox monitoring with non-invasive blood pressure to cycle every 30 minutes. Pt is currently in A-flutter HR 120s awating PETTY procedure. Pt denies pain at this time; no acute distress or discomfort reported or observed. Pt is on 2L oxygen via NC.

## 2024-04-22 NOTE — BRIEF OP NOTE
[facility] - Surgery  Brief Operative Note     SUMMARY     Surgery Date: 4/22/2024     Surgeons and Role:     * Pradip Eng MD - Primary    Assisting Surgeon: None    Pre-op Diagnosis:  Atrial flutter with rapid ventricular response [I48.92]    Post-op Diagnosis:  Post-Op Diagnosis Codes:     * Atrial flutter with rapid ventricular response [I48.92]    Procedure(s) (LRB):  Transesophageal echo (PETTY) intra-procedure log documentation (N/A)  Cardioversion (N/A)    Anesthesia: Monitor Anesthesia Care    Description of the findings of the procedure: uneventful PETTY/DCCV with anesthesia    Findings/Key Components:   Severe LV dysfxn, EF 15-20%, global HK.  Mild RV dysfxn  Biatrial enlargement.  No LA/CATIA thrombus  Normal valves  Mild MR/TR    Successful DCCV AFL->SR 60 BPM 75J x1    Plan:  Cont med rx  Initiate Xarelto 20mg qd  Initiate coreg 3.125mg bid  Initiate entresto 24/26mg bid  Initiate lasix 20mg bud  Initiate Jardiance 10mg qd  Initiate aldactone 25mg qd  Lifevest ordered, OK to fit as outpatient  Follow up with Dr. Eng  Repeat echo in 1-2 months for reassessment of LVEF    Estimated Blood Loss: 0 mL         Specimens: None    Diet: ADA 2000    Activity: ad arvind.

## 2024-04-23 VITALS
BODY MASS INDEX: 27.83 KG/M2 | OXYGEN SATURATION: 93 % | WEIGHT: 205.5 LBS | HEIGHT: 72 IN | RESPIRATION RATE: 18 BRPM | TEMPERATURE: 98 F | HEART RATE: 66 BPM | SYSTOLIC BLOOD PRESSURE: 127 MMHG | DIASTOLIC BLOOD PRESSURE: 61 MMHG

## 2024-04-23 PROBLEM — I48.92 ATRIAL FLUTTER WITH RAPID VENTRICULAR RESPONSE: Status: RESOLVED | Noted: 2024-04-22 | Resolved: 2024-04-23

## 2024-04-23 PROBLEM — I50.21 ACUTE SYSTOLIC CONGESTIVE HEART FAILURE: Status: ACTIVE | Noted: 2024-04-22

## 2024-04-23 PROBLEM — E83.51 HYPOCALCEMIA: Status: RESOLVED | Noted: 2024-04-22 | Resolved: 2024-04-23

## 2024-04-23 PROBLEM — E83.42 HYPOMAGNESEMIA: Status: RESOLVED | Noted: 2024-04-22 | Resolved: 2024-04-23

## 2024-04-23 PROBLEM — I50.21 ACUTE SYSTOLIC CONGESTIVE HEART FAILURE: Status: RESOLVED | Noted: 2024-04-22 | Resolved: 2024-04-23

## 2024-04-23 LAB
ALBUMIN SERPL BCP-MCNC: 3.5 G/DL (ref 3.5–5.2)
ALP SERPL-CCNC: 65 U/L (ref 55–135)
ALT SERPL W/O P-5'-P-CCNC: 17 U/L (ref 10–44)
ANION GAP SERPL CALC-SCNC: 8 MMOL/L (ref 8–16)
AST SERPL-CCNC: 15 U/L (ref 10–40)
BASOPHILS # BLD AUTO: 0.04 K/UL (ref 0–0.2)
BASOPHILS NFR BLD: 0.6 % (ref 0–1.9)
BILIRUB SERPL-MCNC: 2.5 MG/DL (ref 0.1–1)
BUN SERPL-MCNC: 14 MG/DL (ref 8–23)
CA-I BLDV-SCNC: 1.18 MMOL/L (ref 1.06–1.42)
CALCIUM SERPL-MCNC: 9.7 MG/DL (ref 8.7–10.5)
CHLORIDE SERPL-SCNC: 105 MMOL/L (ref 95–110)
CO2 SERPL-SCNC: 27 MMOL/L (ref 23–29)
CREAT SERPL-MCNC: 0.9 MG/DL (ref 0.5–1.4)
DIFFERENTIAL METHOD BLD: NORMAL
EOSINOPHIL # BLD AUTO: 0.1 K/UL (ref 0–0.5)
EOSINOPHIL NFR BLD: 0.7 % (ref 0–8)
ERYTHROCYTE [DISTWIDTH] IN BLOOD BY AUTOMATED COUNT: 13.2 % (ref 11.5–14.5)
EST. GFR  (NO RACE VARIABLE): >60 ML/MIN/1.73 M^2
ESTIMATED AVG GLUCOSE: 332 MG/DL (ref 68–131)
GLUCOSE SERPL-MCNC: 191 MG/DL (ref 70–110)
HBA1C MFR BLD: 13.2 % (ref 4–5.6)
HCT VFR BLD AUTO: 45 % (ref 40–54)
HGB BLD-MCNC: 14.4 G/DL (ref 14–18)
IMM GRANULOCYTES # BLD AUTO: 0.01 K/UL (ref 0–0.04)
IMM GRANULOCYTES NFR BLD AUTO: 0.1 % (ref 0–0.5)
LYMPHOCYTES # BLD AUTO: 1.7 K/UL (ref 1–4.8)
LYMPHOCYTES NFR BLD: 25.8 % (ref 18–48)
MAGNESIUM SERPL-MCNC: 2 MG/DL (ref 1.6–2.6)
MCH RBC QN AUTO: 28.9 PG (ref 27–31)
MCHC RBC AUTO-ENTMCNC: 32 G/DL (ref 32–36)
MCV RBC AUTO: 90 FL (ref 82–98)
MONOCYTES # BLD AUTO: 0.5 K/UL (ref 0.3–1)
MONOCYTES NFR BLD: 7.4 % (ref 4–15)
NEUTROPHILS # BLD AUTO: 4.4 K/UL (ref 1.8–7.7)
NEUTROPHILS NFR BLD: 65.4 % (ref 38–73)
NRBC BLD-RTO: 0 /100 WBC
OHS QRS DURATION: 86 MS
OHS QRS DURATION: 90 MS
OHS QTC CALCULATION: 471 MS
OHS QTC CALCULATION: 505 MS
PHOSPHATE SERPL-MCNC: 5.8 MG/DL (ref 2.7–4.5)
PLATELET # BLD AUTO: 198 K/UL (ref 150–450)
PMV BLD AUTO: 11.9 FL (ref 9.2–12.9)
POCT GLUCOSE: 168 MG/DL (ref 70–110)
POCT GLUCOSE: 170 MG/DL (ref 70–110)
POTASSIUM SERPL-SCNC: 4.2 MMOL/L (ref 3.5–5.1)
PROT SERPL-MCNC: 6.2 G/DL (ref 6–8.4)
RBC # BLD AUTO: 4.99 M/UL (ref 4.6–6.2)
SODIUM SERPL-SCNC: 140 MMOL/L (ref 136–145)
WBC # BLD AUTO: 6.75 K/UL (ref 3.9–12.7)

## 2024-04-23 PROCEDURE — 80053 COMPREHEN METABOLIC PANEL: CPT | Performed by: STUDENT IN AN ORGANIZED HEALTH CARE EDUCATION/TRAINING PROGRAM

## 2024-04-23 PROCEDURE — G0378 HOSPITAL OBSERVATION PER HR: HCPCS

## 2024-04-23 PROCEDURE — 25000242 PHARM REV CODE 250 ALT 637 W/ HCPCS: Performed by: INTERNAL MEDICINE

## 2024-04-23 PROCEDURE — 36415 COLL VENOUS BLD VENIPUNCTURE: CPT | Performed by: STUDENT IN AN ORGANIZED HEALTH CARE EDUCATION/TRAINING PROGRAM

## 2024-04-23 PROCEDURE — 25000003 PHARM REV CODE 250: Performed by: INTERNAL MEDICINE

## 2024-04-23 PROCEDURE — 85025 COMPLETE CBC W/AUTO DIFF WBC: CPT | Performed by: STUDENT IN AN ORGANIZED HEALTH CARE EDUCATION/TRAINING PROGRAM

## 2024-04-23 PROCEDURE — 99214 OFFICE O/P EST MOD 30 MIN: CPT | Mod: ,,, | Performed by: INTERNAL MEDICINE

## 2024-04-23 PROCEDURE — 84100 ASSAY OF PHOSPHORUS: CPT | Performed by: STUDENT IN AN ORGANIZED HEALTH CARE EDUCATION/TRAINING PROGRAM

## 2024-04-23 PROCEDURE — 83735 ASSAY OF MAGNESIUM: CPT | Performed by: STUDENT IN AN ORGANIZED HEALTH CARE EDUCATION/TRAINING PROGRAM

## 2024-04-23 RX ORDER — CARVEDILOL 6.25 MG/1
6.25 TABLET ORAL 2 TIMES DAILY
Status: DISCONTINUED | OUTPATIENT
Start: 2024-04-23 | End: 2024-04-23 | Stop reason: HOSPADM

## 2024-04-23 RX ORDER — CARVEDILOL 6.25 MG/1
6.25 TABLET ORAL 2 TIMES DAILY
Qty: 60 TABLET | Refills: 2 | Status: SHIPPED | OUTPATIENT
Start: 2024-04-23 | End: 2024-07-22

## 2024-04-23 RX ORDER — SPIRONOLACTONE 25 MG/1
25 TABLET ORAL DAILY
Qty: 30 TABLET | Refills: 2 | Status: SHIPPED | OUTPATIENT
Start: 2024-04-23 | End: 2024-07-22

## 2024-04-23 RX ORDER — FUROSEMIDE 20 MG/1
20 TABLET ORAL 2 TIMES DAILY
Qty: 60 TABLET | Refills: 2 | Status: SHIPPED | OUTPATIENT
Start: 2024-04-23 | End: 2024-07-22

## 2024-04-23 RX ORDER — LOSARTAN POTASSIUM 50 MG/1
50 TABLET ORAL DAILY
Qty: 30 TABLET | Refills: 2 | Status: SHIPPED | OUTPATIENT
Start: 2024-04-24 | End: 2024-07-23

## 2024-04-23 RX ORDER — LOSARTAN POTASSIUM 25 MG/1
50 TABLET ORAL DAILY
Status: DISCONTINUED | OUTPATIENT
Start: 2024-04-24 | End: 2024-04-23 | Stop reason: HOSPADM

## 2024-04-23 RX ADMIN — SPIRONOLACTONE 25 MG: 25 TABLET ORAL at 08:04

## 2024-04-23 RX ADMIN — EMPAGLIFLOZIN 10 MG: 10 TABLET, FILM COATED ORAL at 08:04

## 2024-04-23 RX ADMIN — SACUBITRIL AND VALSARTAN 1 TABLET: 24; 26 TABLET, FILM COATED ORAL at 08:04

## 2024-04-23 RX ADMIN — CARVEDILOL 6.25 MG: 6.25 TABLET, FILM COATED ORAL at 08:04

## 2024-04-23 RX ADMIN — FUROSEMIDE 20 MG: 20 TABLET ORAL at 08:04

## 2024-04-23 NOTE — HOSPITAL COURSE
Interval Hx: feeling well.  No cp/sob.  Importance of med rx adherence stressed to pt.  Lifevest discussed.    Tele: SR 80s (personally reviewed and interpreted)

## 2024-04-23 NOTE — ASSESSMENT & PLAN NOTE
Chronic, controlled.   -Patient now with new onset of atrial flutter with RVR  and HFrEF  -Cardiology consulted: s/p PETTY/DCCV on 04/22/24. Echo with EF 15-20%, severe LV dysfunction and global hypokinesis  -Cardiology initiated Xarelto, Coreg, Entresto, Lasix, Jardiance, Aldactone.      Latest blood pressure and vitals reviewed-     Temp:  [97.5 °F (36.4 °C)-98.1 °F (36.7 °C)]   Pulse:  []   Resp:  [16-28]   BP: (104-152)/(61-90)   SpO2:  [93 %-100 %] .   Home meds for hypertension were reviewed and noted below.       While in the hospital, will manage blood pressure as follows; Adjust home antihypertensive regimen as follows- see plan as above    Will utilize p.r.n. blood pressure medication only if patient's blood pressure greater than 180/110 and he develops symptoms such as worsening chest pain or shortness of breath.

## 2024-04-23 NOTE — ASSESSMENT & PLAN NOTE
Cont med rx/GDMT  Repeat echo in 1-2 months now that pt back in SR  If EF remains depressed, consider isch eval (as outpatient)  Lifevest ordered, can be fit as outpatient.

## 2024-04-23 NOTE — PLAN OF CARE
04/23/24 0928   Final Note   Assessment Type Final Discharge Note   Anticipated Discharge Disposition Home   Hospital Resources/Appts/Education Provided Appointments scheduled and added to AVS   Post-Acute Status   Post-Acute Authorization Other  (LIFE VEST)     Call placed to pt to get local address where pt is to be fitted post hospital discharge. At this time pt does not know physical address but requested that representative from [facility] call him in about an hour when he gets home he will have the address. Pt to be fitted in the home for Lifevest, pt does not wish to stay in hospital to wait for representative to come to hospital .  Serge with [facility] aware and will contact pt to get [place] address prior to going to the home.    Pts nurse Luisa notified that the pt can d/c from CM standpoint

## 2024-04-23 NOTE — ASSESSMENT & PLAN NOTE
"Patient's FSGs are uncontrolled due to hyperglycemia on current medication regimen.  Last A1c reviewed- No results found for: "LABA1C", "HGBA1C"  Most recent fingerstick glucose reviewed-   Recent Labs   Lab 04/22/24  1922 04/23/24  0014 04/23/24  0734   POCTGLUCOSE 246* 170* 168*     Current correctional scale  Low  Maintain anti-hyperglycemic dose as follows-   Antihyperglycemics (From admission, onward)    Start     Stop Route Frequency Ordered    04/22/24 1554  insulin aspart U-100 pen 0-5 Units         -- SubQ Before meals & nightly PRN 04/22/24 1456    04/22/24 1515  empagliflozin (Jardiance) tablet 10 mg        Question Answer Comment   Does this patient have a diagnosis of heart failure? Yes    Does this patient have type 1 diabetes or diabetic ketoacidosis? No    Does this patient have symptomatic hypotension? No    Is the patient NPO or pending major surgery in next 3 days or less? No        -- Oral Daily 04/22/24 1410        Hold Oral hypoglycemics while patient is in the hospital.  Repeat A1C  "

## 2024-04-23 NOTE — PROGRESS NOTES
[facility]  Nurses Note -- 4 Eyes      4/22/2024       Skin assessed on: Q Shift      [x] No Pressure Injuries Present    [x]Prevention Measures Documented    [] Yes LDA  for Pressure Injury Previously documented     [] Yes New Pressure Injury Discovered   [] LDA for New Pressure Injury Added      Attending RN:  Eli Fagan RN     Second RN:  COREY Guevara

## 2024-04-23 NOTE — DISCHARGE SUMMARY
[facility] Medicine  Discharge Summary      Patient Name: Varghese Amaya  MRN: [id number]  [facility]: [id number]  Patient Class: OP- Observation  Admission Date: 4/22/2024  Hospital Length of Stay: 0 days  Discharge Date and Time:  04/23/2024 8:06 AM  Attending Physician: Bill Damon MD   Discharging Provider: Bill Damon MD  Primary Care Provider: Unable, To Obtain    Primary Care Team: Networked reference to record PCT     HPI:   69-year-old male with past medical history of hypertension and diabetes presented to the ED with complaints of intermittent shortness of breath.  Seen in post procedure area. Patient stated that it started approximately 1.5 weeks ago and just felt like there are times where he cannot catch his breath.  States that it would occur both at rest and with ambulation. He used his wife's mobile EKG device which noted atrial fibillation so he went to [facility].  HR was >140s per ED. There he received IV Cardizem 5 mg without improvement.  Thus, was transfer to [facility] for further evaluation.  Patient denies any headaches, chest pain, vision changes, syncope, or any history of stroke or MIs. Denies lower extremities swelling.     In the ED, patient tachycardic with heart rate in the 120s.  Patient was placed on nasal cannula.  Received 15mg of dilt with ED with better rate control. EKG with atrial flutter.  Chest x-ray with Ill-defined airspace opacity in the right lung base, prominent interstitial markings bilaterally. Labs significant for hypomagnesemia and hypocalcemia.  Electrolytes replaced in the ED. Cardiology consulted.  Patient given Lovenox 1 mg/kg.  Admitted to hospital medicine for further evaluation    Procedure(s) (LRB):  Transesophageal echo (PETTY) intra-procedure log documentation (N/A)  Cardioversion (N/A)      Hospital Course:   Mrs. Amaya is a pleasant 69-year-old female who has a past medical history of CHF,  hypertension,  and diabetes who was admitted to the hospital for new onset atrial flutter RVR.  Patient was seen in consultation by Cardiology, started on Cardizem drip in the ER, patient underwent DCCV with successful conversion to sinus rhythm.  Patient is started on GDM T for her CHF.  Patient's condition improved.  Echo showed EF 15-20%.  Patient recommended to follow up with outpatient cardiologist in [place], to be evaluated for LifeVest further as outpatient.  Patient reports his symptoms improved.  No shortness a breath, chest pain, nausea, vomiting, or abdominal pain type symptoms.  Patient was discharged home today.  Follow up with PCP in 1 week.  Cardiology clinic in 1-2 weeks in the local community in [place].  The patient if she develops chest pain or shortness of breath symptoms she should go to the local ER.  Laboratories on discharge showed glucose 168, sodium 140, potassium 4.2, creatinine 0.9, magnesium 2.0, WBC 6.7, hemoglobin 14, platelet count 198.     Goals of Care Treatment Preferences:  Code Status: Full Code      Consults:   Consults (From admission, onward)          Status Ordering Provider     Inpatient consult to Social Work/Case Management  Once        Provider:  (Not yet assigned)    Completed JHOAN ROWLEY     Inpatient consult to Registered Dietitian/Nutritionist  Once        Provider:  (Not yet assigned)    Acknowledged JHOAN ROWLEY.     Inpatient consult to Social Work/Case Management  Once        Provider:  (Not yet assigned)    Completed TIKI ENG     Inpatient consult to Cardiology  Once        Provider:  Tiki Eng MD    Completed CATHIE MCCLELLAN            No new Assessment & Plan notes have been filed under this hospital service since the last note was generated.  Service: Hospital Medicine    Final Active Diagnoses:    Diagnosis Date Noted POA    Primary hypertension [I10] 04/22/2024 Yes    Type 2 diabetes mellitus without complication, without long-term  "current use of insulin [E11.9] 04/22/2024 Yes      Problems Resolved During this Admission:    Diagnosis Date Noted Date Resolved POA    PRINCIPAL PROBLEM:  Atrial flutter with rapid ventricular response [I48.92] 04/22/2024 04/23/2024 Yes    Acute systolic congestive heart failure [I50.21] 04/22/2024 04/23/2024 Yes    Hypomagnesemia [E83.42] 04/22/2024 04/23/2024 Yes    Hypocalcemia [E83.51] 04/22/2024 04/23/2024 Yes       Discharged Condition: good    Disposition: Home or Self Care    Follow Up:   Follow-up Information       Unable, To Obtain Follow up in 1 week(s).                           Patient Instructions:   No discharge procedures on file.    Significant Diagnostic Studies: Labs: CMP   Recent Labs   Lab 04/22/24  1311 04/22/24  1959 04/23/24  0428    140 140   K 3.3* 4.2 4.2   * 102 105   CO2 22* 27 27   * 278* 191*   BUN 14 14 14   CREATININE 0.7 1.0 0.9   CALCIUM 7.5* 10.1 9.7   PROT 5.1* 7.1 6.2   ALBUMIN 2.8* 3.8 3.5   BILITOT 1.7* 2.4* 2.5*   ALKPHOS 54* 73 65   AST 14 19 15   ALT 14 19 17   ANIONGAP 5* 11 8   , CBC   Recent Labs   Lab 04/22/24  1100 04/23/24  0429   WBC 6.22 6.75   HGB 15.0 14.4   HCT 44.7 45.0    198   , Lipid Panel No results found for: "CHOL", "HDL", "LDLCALC", "TRIG", "CHOLHDL", Troponin   Recent Labs   Lab 04/22/24  1100   TROPONINI 0.037*   , and A1C: No results for input(s): "HGBA1C" in the last 4320 hours.    Pending Diagnostic Studies:       Procedure Component Value Units Date/Time    Calcium, ionized [[id number]] Collected: 04/22/24 1632    Order Status: Sent Lab Status: In process Updated: 04/23/24 1000    Specimen: Blood            Medications:  Reconciled Home Medications:      Medication List        START taking these medications      carvediloL 6.25 MG tablet  Commonly known as: COREG  Take 1 tablet (6.25 mg total) by mouth 2 (two) times daily.     furosemide 20 MG tablet  Commonly known as: LASIX  Take 1 tablet (20 mg total) by mouth 2 (two) " times daily.     losartan 50 MG tablet  Commonly known as: COZAAR  Take 1 tablet (50 mg total) by mouth once daily.  Start taking on: April 24, 2024     rivaroxaban 20 mg Tab  Commonly known as: XARELTO  Take 1 tablet (20 mg total) by mouth daily with dinner or evening meal.     spironolactone 25 MG tablet  Commonly known as: ALDACTONE  Take 1 tablet (25 mg total) by mouth once daily.              Indwelling Lines/Drains at time of discharge:   Lines/Drains/Airways       None                   Time spent on the discharge of patient: 32 minutes         Bill Damon MD  Department of Hospital Medicine  [facility] - Observation

## 2024-04-23 NOTE — NURSING
PER handoff received from COREY Benitez     Pt resting in bed quietly. NAD noted. No c/o pain.     Fall and safety precautions maintained. Bed alarm activated and audible.. Bed locked in lowest position, with side rails up x2. Call bell and personal items within reach

## 2024-04-23 NOTE — CONSULTS
Message sent to Serge with [facility] to advise of referral sent for review.  Pt will d/c home on today and can be fitted in the home.  TN to follow for response.   The patient is a 21y Male complaining of dizziness.

## 2024-04-23 NOTE — PLAN OF CARE
Order for Lifevest sent to [facility] along with clinicals for review.  TN indicated that the pt can be fitted in the home and will d/c to home on today   Heparin SQ

## 2024-04-23 NOTE — SUBJECTIVE & OBJECTIVE
No past medical history on file.    No past surgical history on file.    Review of patient's allergies indicates:  No Known Allergies    No current outpatient medications      Current Facility-Administered Medications   Medication Dose Route Frequency Provider Last Rate Last Admin    carvediloL tablet 3.125 mg  3.125 mg Oral BID Pradip Eng MD   3.125 mg at 04/22/24 2143    dextrose 10% bolus 125 mL 125 mL  12.5 g Intravenous PRN Obrien, [facility] T., DO        dextrose 10% bolus 250 mL 250 mL  25 g Intravenous PRN Obrien, [facility] T., DO        empagliflozin (Jardiance) tablet 10 mg  10 mg Oral Daily Pradip Eng MD   10 mg at 04/22/24 1537    furosemide tablet 20 mg  20 mg Oral BID Pradip Eng MD   20 mg at 04/22/24 1747    glucagon (human recombinant) injection 1 mg  1 mg Intramuscular PRN Obrien, [facility] T., DO        glucose chewable tablet 16 g  16 g Oral PRN Obrien, [facility] T., DO        glucose chewable tablet 24 g  24 g Oral PRN Obrien, [facility] T., DO        insulin aspart U-100 pen 0-5 Units  0-5 Units Subcutaneous QID (AC + HS) PRN Obrien, [facility] T., DO        naloxone 0.4 mg/mL injection 0.02 mg  0.02 mg Intravenous PRN Obrien, [facility] T., DO        rivaroxaban tablet 20 mg  20 mg Oral Daily with dinner Pradip Eng MD   20 mg at 04/22/24 1748    sacubitriL-valsartan 24-26 mg per tablet 1 tablet  1 tablet Oral BID Pradip Eng MD   1 tablet at 04/22/24 2142    sodium chloride 0.9% flush 10 mL  10 mL Intravenous Q12H PRN Obrien, [facility] T., DO        sodium chloride 0.9% flush 10 mL  10 mL Intravenous PRN Obrien, [facility] T., DO        spironolactone tablet 25 mg  25 mg Oral Daily Pradip Eng MD   25 mg at 04/22/24 1537     Family History    None       Tobacco Use    Smoking status: Not on file    Smokeless tobacco: Not on file   Substance and Sexual Activity    Alcohol use: Not on file    Drug use: Not on file    Sexual activity: Not on file     Review of Systems    Gastrointestinal:  Negative for melena.   Genitourinary:  Negative for hematuria.     Objective:     Vital Signs (Most Recent):  Temp: 97.5 °F (36.4 °C) (04/23/24 0419)  Pulse: 68 (04/23/24 0419)  Resp: 18 (04/23/24 0419)  BP: 104/65 (04/23/24 0419)  SpO2: 96 % (04/23/24 0419) Vital Signs (24h Range):  Temp:  [97.5 °F (36.4 °C)-98.1 °F (36.7 °C)] 97.5 °F (36.4 °C)  Pulse:  [] 68  Resp:  [16-28] 18  SpO2:  [93 %-100 %] 96 %  BP: (104-152)/(65-90) 104/65     Weight: 93.2 kg (205 lb 7.5 oz)  Body mass index is 27.87 kg/m².    SpO2: 96 %         Intake/Output Summary (Last 24 hours) at 4/23/2024 0705  Last data filed at 4/22/2024 2145  Gross per 24 hour   Intake 310 ml   Output 1400 ml   Net -1090 ml       Lines/Drains/Airways       Peripheral Intravenous Line  Duration                  Peripheral IV - Single Lumen 04/22/24 20 G Right Antecubital 1 day         Peripheral IV - Single Lumen 04/22/24 1055 20 G Posterior;Right Hand <1 day                     Physical Exam  Constitutional:       General: He is not in acute distress.     Appearance: He is well-developed. He is not ill-appearing, toxic-appearing or diaphoretic.   HENT:      Head: Normocephalic and atraumatic.   Eyes:      General: No scleral icterus.     Extraocular Movements: Extraocular movements intact.      Conjunctiva/sclera: Conjunctivae normal.      Pupils: Pupils are equal, round, and reactive to light.   Neck:      Thyroid: No thyromegaly.      Vascular: No JVD.      Trachea: No tracheal deviation.   Cardiovascular:      Rate and Rhythm: Normal rate and regular rhythm.      Heart sounds: S1 normal and S2 normal. Heart sounds are distant. No murmur heard.     No friction rub. No gallop.   Pulmonary:      Effort: Pulmonary effort is normal. No respiratory distress.      Breath sounds: Normal breath sounds. No stridor. No wheezing, rhonchi or rales.   Chest:      Chest wall: No tenderness.   Abdominal:      General: There is no distension.       Palpations: Abdomen is soft.   Musculoskeletal:         General: No swelling or tenderness. Normal range of motion.      Cervical back: Normal range of motion and neck supple. No rigidity.      Right lower leg: No edema.      Left lower leg: No edema.   Skin:     General: Skin is warm and dry.      Coloration: Skin is not jaundiced.   Neurological:      General: No focal deficit present.      Mental Status: He is alert and oriented to person, place, and time.      Cranial Nerves: No cranial nerve deficit.   Psychiatric:         Mood and Affect: Mood normal.         Behavior: Behavior normal.          Current Medications:  Current Facility-Administered Medications   Medication Dose Route Frequency    carvediloL  3.125 mg Oral BID    empagliflozin  10 mg Oral Daily    furosemide  20 mg Oral BID    rivaroxaban  20 mg Oral Daily with dinner    sacubitriL-valsartan  1 tablet Oral BID    spironolactone  25 mg Oral Daily     Current Facility-Administered Medications   Medication Dose Route Frequency Last Rate Last Admin       Current Facility-Administered Medications:     dextrose 10%, 12.5 g, Intravenous, PRN    dextrose 10%, 25 g, Intravenous, PRN    glucagon (human recombinant), 1 mg, Intramuscular, PRN    glucose, 16 g, Oral, PRN    glucose, 24 g, Oral, PRN    insulin aspart U-100, 0-5 Units, Subcutaneous, QID (AC + HS) PRN    naloxone, 0.02 mg, Intravenous, PRN    sodium chloride 0.9%, 10 mL, Intravenous, Q12H PRN    sodium chloride 0.9%, 10 mL, Intravenous, PRN    Laboratory (all labs reviewed):  CBC:  Recent Labs   Lab 04/22/24  1100 04/23/24  0429   WBC 6.22 6.75   Hemoglobin 15.0 14.4   Hematocrit 44.7 45.0   Platelets 178 198       CHEMISTRIES:  Recent Labs   Lab 04/22/24  1206 04/22/24  1311 04/22/24  1959 04/23/24  0428   Glucose 127 H 222 H 278 H 191 H   Sodium 133 L 139 140 140   Potassium 4.0 3.3 L 4.2 4.2   BUN 8 14 14 14   Creatinine 0.3 L 0.7 1.0 0.9   eGFR >60 >60 >60 >60   Calcium 6.6 LL 7.5 L 10.1 9.7    Magnesium <0.7 LL 1.3 L 2.1 2.0       CARDIAC BIOMARKERS:  Recent Labs   Lab 04/22/24  1100   Troponin I 0.037 H       COAGS:        LIPIDS/LFTS:  Recent Labs   Lab 04/22/24  1206 04/22/24  1311 04/22/24  1959 04/23/24  0428   AST 5 L 14 19 15   ALT <5 L 14 19 17       BNP:  Recent Labs   Lab 04/22/24  1100    H       TSH:  Recent Labs   Lab 04/22/24  1100   TSH 1.661       Free T4:        Diagnostic Results:  ECG (personally reviewed and interpreted tracing(s)):  4/22/24 1052     Chest X-Ray (personally reviewed and interpreted image(s)): 4/22/24 CHF    PETTY/DCCV 4/22/24 (images personally reviewed and interpreted)  Severe LV dysfxn, EF 15-20%, global HK.  Mild RV dysfxn  Biatrial enlargement.  No LA/CATIA thrombus  Normal valves  Mild MR/TR  Successful DCCV AFL->SR 60 BPM 75J x1  Plan:  Cont med rx  Initiate Xarelto 20mg qd  Initiate coreg 3.125mg bid  Initiate entresto 24/26mg bid  Initiate lasix 20mg bud  Initiate Jardiance 10mg qd  Initiate aldactone 25mg qd  Lifevest ordered, OK to fit as outpatient  Follow up with Dr. Eng  Repeat echo in 1-2 months for reassessment of LVEF    Echo: 4/22/24 (images personally reviewed and interpreted)    Left Ventricle: The left ventricle is normal in size. Mildly increased wall thickness. There is mild concentric hypertrophy. Severe global hypokinesis present. There is severely reduced systolic function with a visually estimated ejection fraction of 15 - 20%. Unable to assess diastolic function due to atrial fibrillation.    Right Ventricle: Mild right ventricular enlargement. Systolic function is mildly reduced.    Aorta: Aortic root is mildly dilated measuring 3.67 cm.    Pulmonary Artery: The estimated pulmonary artery systolic pressure is 56 mmHg.    Pt in AFL throughout exam.

## 2024-04-23 NOTE — ASSESSMENT & PLAN NOTE
-presented with intermittent shortness of breath. Has bilateral LE edema. Found to be in aflutter RVR  -  -CXR with prominent interstitial markings bilaterally.   -Echo with EF 15-20%, severe global hypokinesis.  -Started on CHF pathway  -Started PO lasix  -Cardiology initiated Xarelto, Coreg, Entresto, Lasix, Jardiance, Aldactone.  -Strict Is/Os        Patient is identified as having Systolic (HFrEF) heart failure that is Acute. CHF is currently uncontrolled due to Continued edema of extremities. Latest ECHO performed and demonstrates- Results for orders placed during the hospital encounter of 04/22/24    Echo    Interpretation Summary    Left Ventricle: The left ventricle is normal in size. Mildly increased wall thickness. There is mild concentric hypertrophy. Severe global hypokinesis present. There is severely reduced systolic function with a visually estimated ejection fraction of 15 - 20%. Unable to assess diastolic function due to atrial fibrillation.    Right Ventricle: Mild right ventricular enlargement. Systolic function is mildly reduced.    Aorta: Aortic root is mildly dilated measuring 3.67 cm.    Pulmonary Artery: The estimated pulmonary artery systolic pressure is 56 mmHg.    Pt in AFL throughout exam.  . Continue Beta Blocker, Furosemide, and Aldactone and monitor clinical status closely. Monitor on telemetry. Patient is on CHF pathway.  Monitor strict Is&Os and daily weights.  Place on fluid restriction of 1.5 L. Cardiology has been consulted. Continue to stress to patient importance of self efficacy and  on diet for CHF. Last BNP reviewed- and noted below   Recent Labs   Lab 04/22/24  1100   *

## 2024-04-23 NOTE — ASSESSMENT & PLAN NOTE
-presented with progressive shortness of breath for the past 1-2 weeks and found to be in atrial flutter with RVR.    -hx of HTN, DM, Age 69: CHADS-VASc 3.    -s/p 15mg of dilt in the ED with better rate control.  -Cardiology consulted: s/p PETTY/DCCV on 04/22/24. Echo with EF 15-20%, severe LV dysfunction and global hypokinesis  -Cardiology initiated Xarelto, Coreg, Entresto, Lasix, Jardiance, Aldactone.  -continue to monitor patient on telemetry  -follow up with outpatient cardiologist in local community in [place]  -patient to be evaluated for LifeVest as outpatient with her regular cardiologist.

## 2024-04-23 NOTE — ASSESSMENT & PLAN NOTE
"Patient has hypocalcemia and has been confirmed with ionized and/or corrected calcium. Will replace calcium and monitor electrolytes closely. The latest calcium labs have been reviewed and are listed below.  Recent Labs   Lab 04/23/24  0428   CALCIUM 9.7         No results for input(s): "CAION" in the last 24 hours.      "

## 2024-04-23 NOTE — PLAN OF CARE
Problem: Adult Inpatient Plan of Care  Goal: Plan of Care Review  Outcome: Adequate for Care Transition  Goal: Patient-Specific Goal (Individualized)  Outcome: Adequate for Care Transition  Goal: Absence of Hospital-Acquired Illness or Injury  Outcome: Adequate for Care Transition  Goal: Optimal Comfort and Wellbeing  Outcome: Adequate for Care Transition  Goal: Readiness for Transition of Care  Outcome: Adequate for Care Transition     Problem: Diabetes Comorbidity  Goal: Blood Glucose Level Within Targeted Range  Outcome: Adequate for Care Transition

## 2024-04-23 NOTE — PROGRESS NOTES
AVS virtually reviewed with patient in its entirety with emphasis on medications, follow-up appointments and reasons to return to the ED or contact the [facility] On Call Nurse Care Line. Patient also encouraged to utilize their patient portal. Email sent to spouse as requested for activation. Ease and convenience of use reiterated. Education complete and patient voiced understanding. All questions answered. Discharge teaching complete.

## 2024-04-23 NOTE — PLAN OF CARE
Problem: Adult Inpatient Plan of Care  Goal: Plan of Care Review  Outcome: Progressing  Goal: Patient-Specific Goal (Individualized)  Outcome: Progressing  Goal: Absence of Hospital-Acquired Illness or Injury  Outcome: Progressing  Goal: Optimal Comfort and Wellbeing  Outcome: Progressing  Goal: Readiness for Transition of Care  Outcome: Progressing     Problem: Diabetes Comorbidity  Goal: Blood Glucose Level Within Targeted Range  Outcome: Progressing

## 2024-04-23 NOTE — ASSESSMENT & PLAN NOTE
Patient has Abnormal Magnesium: hypomagnesemia. Will continue to monitor electrolytes closely. Will replace the affected electrolytes and repeat labs to be done after interventions completed. The patient's magnesium results have been reviewed and are listed below.  Recent Labs   Lab 04/23/24  0428   MG 2.0

## 2024-04-23 NOTE — ASSESSMENT & PLAN NOTE
The patient presents with several weeks of progressive shortness of breath and found to be in atrial flutter with RVR.    CHADS-VASC 3.    Successful PETTY/DCCV 4/22/24  EF 15% noted, ?tachymyopathy  Cont Xarelto 20mg qd  Cont GDMT  Lifevest ordered, can be fit as outpatient  Repeat echo in 1-2 months for reassessment of LVEF now that pt is in SR.

## 2024-04-23 NOTE — PROGRESS NOTES
[facility] [place]  Cardiology  Progress Note    Patient Name: Varghese Amaya  MRN: [id number]  Admission Date: 4/22/2024  Hospital Length of Stay: 0 days  Code Status: Full Code   Attending Physician: Bill Damon MD   Primary Care Physician: Unable, To Obtain  Expected Discharge Date:   Principal Problem:Atrial flutter with rapid ventricular response    Subjective:     Interval Hx: feeling well.  No cp/sob.  Importance of med rx adherence stressed to pt.  Lifevest discussed.    Tele: SR 80s (personally reviewed and interpreted)      No past medical history on file.    No past surgical history on file.    Review of patient's allergies indicates:  No Known Allergies    No current outpatient medications      Current Facility-Administered Medications   Medication Dose Route Frequency Provider Last Rate Last Admin    carvediloL tablet 3.125 mg  3.125 mg Oral BID Pradip Eng MD   3.125 mg at 04/22/24 2143    dextrose 10% bolus 125 mL 125 mL  12.5 g Intravenous PRN [facility]        dextrose 10% bolus 250 mL 250 mL  25 g Intravenous PRN [facility]        empagliflozin (Jardiance) tablet 10 mg  10 mg Oral Daily Pradip Eng MD   10 mg at 04/22/24 1537    furosemide tablet 20 mg  20 mg Oral BID Pradip Eng MD   20 mg at 04/22/24 1747    glucagon (human recombinant) injection 1 mg  1 mg Intramuscular PRN Obrien, [facility] DO        glucose chewable tablet 16 g  16 g Oral PRN [facility]        glucose chewable tablet 24 g  24 g Oral PRN [facility]        insulin aspart U-100 pen 0-5 Units  0-5 Units Subcutaneous QID (AC + HS) PRN Obrien [facility] DO        naloxone 0.4 mg/mL injection 0.02 mg  0.02 mg Intravenous PRN Camille [facility] DO        rivaroxaban tablet 20 mg  20 mg Oral Daily with dinner Pradip Eng MD   20 mg at 04/22/24 1748    sacubitriL-valsartan 24-26 mg per tablet 1 tablet  1 tablet Oral BID Pradip Eng MD   1 tablet  at 04/22/24 2142    sodium chloride 0.9% flush 10 mL  10 mL Intravenous Q12H PRN Obrien, [facility] DO        sodium chloride 0.9% flush 10 mL  10 mL Intravenous PRN Obrien, [facility] DO        spironolactone tablet 25 mg  25 mg Oral Daily Pradip Eng MD   25 mg at 04/22/24 1537     Family History    None       Tobacco Use    Smoking status: Not on file    Smokeless tobacco: Not on file   Substance and Sexual Activity    Alcohol use: Not on file    Drug use: Not on file    Sexual activity: Not on file     Review of Systems   Gastrointestinal:  Negative for melena.   Genitourinary:  Negative for hematuria.     Objective:     Vital Signs (Most Recent):  Temp: 97.5 °F (36.4 °C) (04/23/24 0419)  Pulse: 68 (04/23/24 0419)  Resp: 18 (04/23/24 0419)  BP: 104/65 (04/23/24 0419)  SpO2: 96 % (04/23/24 0419) Vital Signs (24h Range):  Temp:  [97.5 °F (36.4 °C)-98.1 °F (36.7 °C)] 97.5 °F (36.4 °C)  Pulse:  [] 68  Resp:  [16-28] 18  SpO2:  [93 %-100 %] 96 %  BP: (104-152)/(65-90) 104/65     Weight: 93.2 kg (205 lb 7.5 oz)  Body mass index is 27.87 kg/m².    SpO2: 96 %         Intake/Output Summary (Last 24 hours) at 4/23/2024 0705  Last data filed at 4/22/2024 2145  Gross per 24 hour   Intake 310 ml   Output 1400 ml   Net -1090 ml       Lines/Drains/Airways       Peripheral Intravenous Line  Duration                  Peripheral IV - Single Lumen 04/22/24 20 G Right Antecubital 1 day         Peripheral IV - Single Lumen 04/22/24 1055 20 G Posterior;Right Hand <1 day                     Physical Exam  Constitutional:       General: He is not in acute distress.     Appearance: He is well-developed. He is not ill-appearing, toxic-appearing or diaphoretic.   HENT:      Head: Normocephalic and atraumatic.   Eyes:      General: No scleral icterus.     Extraocular Movements: Extraocular movements intact.      Conjunctiva/sclera: Conjunctivae normal.      Pupils: Pupils are equal, round, and reactive to light.   Neck:       Thyroid: No thyromegaly.      Vascular: No JVD.      Trachea: No tracheal deviation.   Cardiovascular:      Rate and Rhythm: Normal rate and regular rhythm.      Heart sounds: S1 normal and S2 normal. Heart sounds are distant. No murmur heard.     No friction rub. No gallop.   Pulmonary:      Effort: Pulmonary effort is normal. No respiratory distress.      Breath sounds: Normal breath sounds. No stridor. No wheezing, rhonchi or rales.   Chest:      Chest wall: No tenderness.   Abdominal:      General: There is no distension.      Palpations: Abdomen is soft.   Musculoskeletal:         General: No swelling or tenderness. Normal range of motion.      Cervical back: Normal range of motion and neck supple. No rigidity.      Right lower leg: No edema.      Left lower leg: No edema.   Skin:     General: Skin is warm and dry.      Coloration: Skin is not jaundiced.   Neurological:      General: No focal deficit present.      Mental Status: He is alert and oriented to person, place, and time.      Cranial Nerves: No cranial nerve deficit.   Psychiatric:         Mood and Affect: Mood normal.         Behavior: Behavior normal.          Current Medications:  Current Facility-Administered Medications   Medication Dose Route Frequency    carvediloL  3.125 mg Oral BID    empagliflozin  10 mg Oral Daily    furosemide  20 mg Oral BID    rivaroxaban  20 mg Oral Daily with dinner    sacubitriL-valsartan  1 tablet Oral BID    spironolactone  25 mg Oral Daily     Current Facility-Administered Medications   Medication Dose Route Frequency Last Rate Last Admin       Current Facility-Administered Medications:     dextrose 10%, 12.5 g, Intravenous, PRN    dextrose 10%, 25 g, Intravenous, PRN    glucagon (human recombinant), 1 mg, Intramuscular, PRN    glucose, 16 g, Oral, PRN    glucose, 24 g, Oral, PRN    insulin aspart U-100, 0-5 Units, Subcutaneous, QID (AC + HS) PRN    naloxone, 0.02 mg, Intravenous, PRN    sodium chloride 0.9%, 10  mL, Intravenous, Q12H PRN    sodium chloride 0.9%, 10 mL, Intravenous, PRN    Laboratory (all labs reviewed):  CBC:  Recent Labs   Lab 04/22/24  1100 04/23/24  0429   WBC 6.22 6.75   Hemoglobin 15.0 14.4   Hematocrit 44.7 45.0   Platelets 178 198       CHEMISTRIES:  Recent Labs   Lab 04/22/24  1206 04/22/24  1311 04/22/24 1959 04/23/24  0428   Glucose 127 H 222 H 278 H 191 H   Sodium 133 L 139 140 140   Potassium 4.0 3.3 L 4.2 4.2   BUN 8 14 14 14   Creatinine 0.3 L 0.7 1.0 0.9   eGFR >60 >60 >60 >60   Calcium 6.6 LL 7.5 L 10.1 9.7   Magnesium <0.7 LL 1.3 L 2.1 2.0       CARDIAC BIOMARKERS:  Recent Labs   Lab 04/22/24  1100   Troponin I 0.037 H       COAGS:        LIPIDS/LFTS:  Recent Labs   Lab 04/22/24  1206 04/22/24  1311 04/22/24 1959 04/23/24  0428   AST 5 L 14 19 15   ALT <5 L 14 19 17       BNP:  Recent Labs   Lab 04/22/24  1100    H       TSH:  Recent Labs   Lab 04/22/24  1100   TSH 1.661       Free T4:        Diagnostic Results:  ECG (personally reviewed and interpreted tracing(s)):  4/22/24 1052     Chest X-Ray (personally reviewed and interpreted image(s)): 4/22/24 CHF    PETTY/DCCV 4/22/24 (images personally reviewed and interpreted)  Severe LV dysfxn, EF 15-20%, global HK.  Mild RV dysfxn  Biatrial enlargement.  No LA/CATIA thrombus  Normal valves  Mild MR/TR  Successful DCCV AFL->SR 60 BPM 75J x1  Plan:  Cont med rx  Initiate Xarelto 20mg qd  Initiate coreg 3.125mg bid  Initiate entresto 24/26mg bid  Initiate lasix 20mg bud  Initiate Jardiance 10mg qd  Initiate aldactone 25mg qd  Lifevest ordered, OK to fit as outpatient  Follow up with Dr. Eng  Repeat echo in 1-2 months for reassessment of LVEF    Echo: 4/22/24 (images personally reviewed and interpreted)    Left Ventricle: The left ventricle is normal in size. Mildly increased wall thickness. There is mild concentric hypertrophy. Severe global hypokinesis present. There is severely reduced systolic function with a visually estimated  ejection fraction of 15 - 20%. Unable to assess diastolic function due to atrial fibrillation.    Right Ventricle: Mild right ventricular enlargement. Systolic function is mildly reduced.    Aorta: Aortic root is mildly dilated measuring 3.67 cm.    Pulmonary Artery: The estimated pulmonary artery systolic pressure is 56 mmHg.    Pt in AFL throughout exam.    Assessment and Plan:     * Atrial flutter with rapid ventricular response  The patient presents with several weeks of progressive shortness of breath and found to be in atrial flutter with RVR.    CHADS-VASC 3.    Successful PETTY/DCCV 4/22/24  EF 15% noted, ?tachymyopathy  Cont Xarelto 20mg qd  Cont GDMT  Lifevest ordered, can be fit as outpatient  Repeat echo in 1-2 months for reassessment of LVEF now that pt is in SR.       Acute systolic congestive heart failure  Cont med rx/GDMT  Repeat echo in 1-2 months now that pt back in SR  If EF remains depressed, consider isch eval (as outpatient)  Lifevest ordered, can be fit as outpatient.    Primary hypertension  Cont med rx    Type 2 diabetes mellitus without complication, without long-term current use of insulin  Mgmt per IM        VTE Risk Mitigation (From admission, onward)           Ordered     rivaroxaban tablet 20 mg  With dinner         04/22/24 1143                  Dispo planning appropriate  Pt to follow up with Cardiology in [place].    Pradip Eng MD  Cardiology  [facility] - [place] - Observation    Addendum:  Pt ins coverage makes entresto and jardiance expensive.  Will d/c jardiance  Change entresto to losartan 50mg qd

## 2024-05-20 ENCOUNTER — TELEPHONE (OUTPATIENT)
Dept: CARDIOLOGY | Facility: CLINIC | Age: 69
End: 2024-05-20
Payer: MEDICARE

## 2024-05-20 NOTE — TELEPHONE ENCOUNTER
----- Message from Fer Eagle sent at 5/20/2024  2:46 PM CDT -----  Can the clinic reply in [facility]: no           Please refill the medication(s) listed below. Please call the patient when the prescription(s) is ready for  at this phone number    [phone] or [phone]               Medication #1 all heart meds                     Preferred Pharmacy: [facility] [phone]